# Patient Record
Sex: FEMALE | Race: BLACK OR AFRICAN AMERICAN | NOT HISPANIC OR LATINO | Employment: FULL TIME | URBAN - METROPOLITAN AREA
[De-identification: names, ages, dates, MRNs, and addresses within clinical notes are randomized per-mention and may not be internally consistent; named-entity substitution may affect disease eponyms.]

---

## 2024-03-25 LAB
EXTERNAL ABO GROUPING: NORMAL
EXTERNAL ANTIBODY SCREEN: NORMAL
EXTERNAL CHLAMYDIA SCREEN: NEGATIVE
EXTERNAL GONORRHEA SCREEN: NEGATIVE
EXTERNAL HEPATITIS B SURFACE ANTIGEN: NEGATIVE
EXTERNAL RH FACTOR: POSITIVE
EXTERNAL RUBELLA IGG QUANTITATION: POSITIVE
HEPATITIS C VIRUS AB PRESENCE IN SERUM EXTERNAL: NONREACTIVE
HIV 1/ 2 AG/AB SCREEN EXTERNAL: NEGATIVE
LAB AP CYTOLOGY REPORT FINAL EXTERNAL: NORMAL
SYPHILIS TOTAL AB EXTERNAL: NEGATIVE
URINE CULTURE EXTERNAL: NORMAL

## 2024-04-12 ENCOUNTER — HOSPITAL ENCOUNTER (OUTPATIENT)
Dept: RADIOLOGY | Facility: HOSPITAL | Age: 24
Discharge: HOME | End: 2024-04-12
Payer: COMMERCIAL

## 2024-04-12 DIAGNOSIS — O36.80X0 PREGNANCY WITH INCONCLUSIVE FETAL VIABILITY (HHS-HCC): ICD-10-CM

## 2024-04-12 PROCEDURE — 76801 OB US < 14 WKS SINGLE FETUS: CPT

## 2024-04-12 PROCEDURE — 76801 OB US < 14 WKS SINGLE FETUS: CPT | Performed by: STUDENT IN AN ORGANIZED HEALTH CARE EDUCATION/TRAINING PROGRAM

## 2024-04-30 ENCOUNTER — APPOINTMENT (OUTPATIENT)
Dept: LAB | Facility: LAB | Age: 24
End: 2024-04-30
Payer: COMMERCIAL

## 2024-04-30 ENCOUNTER — INITIAL PRENATAL (OUTPATIENT)
Dept: OBSTETRICS AND GYNECOLOGY | Facility: HOSPITAL | Age: 24
End: 2024-04-30
Payer: COMMERCIAL

## 2024-04-30 VITALS — WEIGHT: 120 LBS | SYSTOLIC BLOOD PRESSURE: 115 MMHG | BODY MASS INDEX: 21.26 KG/M2 | DIASTOLIC BLOOD PRESSURE: 80 MMHG

## 2024-04-30 DIAGNOSIS — Z34.80 SUPERVISION OF OTHER NORMAL PREGNANCY, ANTEPARTUM (HHS-HCC): Primary | ICD-10-CM

## 2024-04-30 DIAGNOSIS — D75.839 THROMBOCYTOSIS: ICD-10-CM

## 2024-04-30 DIAGNOSIS — N83.209 OVARIAN CYST AFFECTING PREGNANCY IN FIRST TRIMESTER, ANTEPARTUM (HHS-HCC): ICD-10-CM

## 2024-04-30 DIAGNOSIS — O34.81 OVARIAN CYST AFFECTING PREGNANCY IN FIRST TRIMESTER, ANTEPARTUM (HHS-HCC): ICD-10-CM

## 2024-04-30 PROBLEM — G43.909 MIGRAINES: Status: ACTIVE | Noted: 2024-04-30

## 2024-04-30 PROCEDURE — 99214 OFFICE O/P EST MOD 30 MIN: CPT | Performed by: OBSTETRICS & GYNECOLOGY

## 2024-04-30 PROCEDURE — 36415 COLL VENOUS BLD VENIPUNCTURE: CPT

## 2024-04-30 PROCEDURE — 99204 OFFICE O/P NEW MOD 45 MIN: CPT | Performed by: OBSTETRICS & GYNECOLOGY

## 2024-04-30 RX ORDER — VITAMINS AND MINERALS 1; 1000; 100; 400; 30; 3; 3; 15; 20; 12; 7; 200; 29; 20 MG/1; [IU]/1; MG/1; [IU]/1; [IU]/1; MG/1; MG/1; MG/1; MG/1; UG/1; MG/1; MG/1; MG/1; MG/1
29 TABLET, CHEWABLE ORAL
COMMUNITY
End: 2024-05-30 | Stop reason: SDUPTHER

## 2024-04-30 RX ORDER — ELETRIPTAN HYDROBROMIDE 40 MG/1
20 TABLET, FILM COATED ORAL ONCE AS NEEDED
COMMUNITY
Start: 2024-01-08

## 2024-04-30 SDOH — ECONOMIC STABILITY: FOOD INSECURITY: WITHIN THE PAST 12 MONTHS, THE FOOD YOU BOUGHT JUST DIDN'T LAST AND YOU DIDN'T HAVE MONEY TO GET MORE.: NEVER TRUE

## 2024-04-30 SDOH — ECONOMIC STABILITY: FOOD INSECURITY: WITHIN THE PAST 12 MONTHS, YOU WORRIED THAT YOUR FOOD WOULD RUN OUT BEFORE YOU GOT MONEY TO BUY MORE.: NEVER TRUE

## 2024-04-30 ASSESSMENT — EDINBURGH POSTNATAL DEPRESSION SCALE (EPDS)
THE THOUGHT OF HARMING MYSELF HAS OCCURRED TO ME: NEVER
I HAVE BEEN ANXIOUS OR WORRIED FOR NO GOOD REASON: NO, NOT AT ALL
I HAVE FELT SCARED OR PANICKY FOR NO GOOD REASON: NO, NOT MUCH
I HAVE FELT SAD OR MISERABLE: NO, NOT AT ALL
I HAVE BLAMED MYSELF UNNECESSARILY WHEN THINGS WENT WRONG: NO, NEVER
I HAVE LOOKED FORWARD WITH ENJOYMENT TO THINGS: AS MUCH AS I EVER DID
I HAVE BEEN SO UNHAPPY THAT I HAVE BEEN CRYING: NO, NEVER
THINGS HAVE BEEN GETTING ON TOP OF ME: NO, I HAVE BEEN COPING AS WELL AS EVER
I HAVE BEEN ABLE TO LAUGH AND SEE THE FUNNY SIDE OF THINGS: AS MUCH AS I ALWAYS COULD
TOTAL SCORE: 1
I HAVE BEEN SO UNHAPPY THAT I HAVE HAD DIFFICULTY SLEEPING: NOT AT ALL

## 2024-04-30 ASSESSMENT — ENCOUNTER SYMPTOMS
RESPIRATORY NEGATIVE: 0
OCCASIONAL FEELINGS OF UNSTEADINESS: 0
CARDIOVASCULAR NEGATIVE: 0
PALPITATIONS: 0
DYSURIA: 0
WEAKNESS: 0
CONSTIPATION: 0
ALLERGIC/IMMUNOLOGIC NEGATIVE: 0
NEUROLOGICAL NEGATIVE: 0
NAUSEA: 1
DEPRESSION: 0
ENDOCRINE NEGATIVE: 0
CHILLS: 0
ABDOMINAL PAIN: 0
PSYCHIATRIC NEGATIVE: 0
MUSCULOSKELETAL NEGATIVE: 0
CONSTITUTIONAL NEGATIVE: 0
HEMATURIA: 0
EYES NEGATIVE: 0
GASTROINTESTINAL NEGATIVE: 0
DIZZINESS: 0
VOMITING: 0
HEADACHES: 0
SHORTNESS OF BREATH: 0
COUGH: 0
FREQUENCY: 0
LOSS OF SENSATION IN FEET: 0
DIARRHEA: 0
FEVER: 0
HEMATOLOGIC/LYMPHATIC NEGATIVE: 1

## 2024-04-30 ASSESSMENT — PATIENT HEALTH QUESTIONNAIRE - PHQ9
2. FEELING DOWN, DEPRESSED OR HOPELESS: NOT AT ALL
1. LITTLE INTEREST OR PLEASURE IN DOING THINGS: NOT AT ALL
SUM OF ALL RESPONSES TO PHQ9 QUESTIONS 1 & 2: 0

## 2024-04-30 NOTE — PROGRESS NOTES
Initial Prenatal Visit    GEOVANNY Lane is a 24 y.o.  at 11w1d with an estimated date of delivery of 2024, by Ultrasound who presents for an initial prenatal visit.    Doing well. Has initial prenatal labs drawn at [place], some nausea, manageable without meds. Has cyst in left lower quadrant.     OB/GYN History  OB History    Para Term  AB Living   2 1 1     1   SAB IAB Ectopic Multiple Live Births           1      # Outcome Date GA Lbr Pako/2nd Weight Sex Delivery Anes PTL Lv   2 Current            1 Term 19    M    LAUREANO      No LMP recorded. Patient is pregnant.    Prior pregnancy complications: None    The following portions of the chart were reviewed this encounter and updated as appropriate:    Tobacco  Allergies  Meds  Problems  Med Hx  Surg Hx  Fam Hx         Review of Systems  Review of Systems   Constitutional:  Negative for chills and fever.   Eyes:  Negative for visual disturbance.   Respiratory:  Negative for cough and shortness of breath.    Cardiovascular:  Negative for chest pain and palpitations.   Gastrointestinal:  Positive for nausea. Negative for abdominal pain, constipation, diarrhea and vomiting.   Genitourinary:  Negative for dyspareunia, dysuria, frequency, hematuria, urgency, vaginal bleeding and vaginal discharge.   Neurological:  Negative for dizziness, weakness and headaches.        OBJECTIVE  Vitals:    24 1712   BP: 115/80   Weight: 54.4 kg (120 lb)          Expected Total Weight Gain: 11.5 kg (25 lb)-16 kg (35 lb)  Pregravid BMI: 20.55    Physical Exam  See prenatal physical exam tab    ASSESSMENT & PLAN    Migraines  - Follows with neurologist at [facility]  - No current meds    Thrombocytosis  - Follows with hematology at [facility]  - Most recent platelets 500s    Ovarian cyst affecting pregnancy in first trimester, antepartum ([facility])  - Torsion precautions reviewed  - Will reexamine at NT scheduled       Routine care  -Ultrasound  performed +FCA, previously dated  -Initial prenatal labs already done  -OB education provided.  -Recommended vaccines (COVID-19, flu).  -Clinical risk assessment for preeclampsia: low, low-dose aspirin deferred    Genetic counseling  -Patient was also counseled about options for Down's syndrome and other aneuploidy screening. After discussion, patient desires cffDNA  -Advised anatomy ultrasound at 18-20 weeks.    Pregnancy: Bosch     Delivery Plans  Planned delivery method: Vaginal  Planned anesthesia: Epidural  Acceptable blood products: All     Post-Delivery Plans  Feeding intentions: Breast Milk and Formula      Follow up in 4 weeks for return OB visit.    Kristin Gil MD  Obstetrics & Gynecology  04/30/24

## 2024-05-07 ENCOUNTER — PATIENT MESSAGE (OUTPATIENT)
Dept: OBSTETRICS AND GYNECOLOGY | Facility: HOSPITAL | Age: 24
End: 2024-05-07
Payer: COMMERCIAL

## 2024-05-07 DIAGNOSIS — O21.9 NAUSEA AND VOMITING IN PREGNANCY PRIOR TO 22 WEEKS GESTATION (HHS-HCC): Primary | ICD-10-CM

## 2024-05-07 NOTE — TELEPHONE ENCOUNTER
From: Keyla Lane  To: Kristin Gil  Sent: 5/7/2024 12:36 PM EDT  Subject: Medication request     Hi, I’ve been feeling extremely nauseous for the past few days (Today mainly) and would like to request nausea medication.

## 2024-05-08 RX ORDER — ONDANSETRON 4 MG/1
4 TABLET, FILM COATED ORAL 2 TIMES DAILY PRN
Qty: 20 TABLET | Refills: 1 | Status: SHIPPED | OUTPATIENT
Start: 2024-05-08 | End: 2024-06-07

## 2024-05-10 LAB — SCAN RESULT: NORMAL

## 2024-05-14 ENCOUNTER — HOSPITAL ENCOUNTER (OUTPATIENT)
Dept: RADIOLOGY | Facility: HOSPITAL | Age: 24
Discharge: HOME | End: 2024-05-14
Payer: COMMERCIAL

## 2024-05-14 DIAGNOSIS — Z3A.18 18 WEEKS GESTATION OF PREGNANCY (HHS-HCC): ICD-10-CM

## 2024-05-14 DIAGNOSIS — Z36.82 NUCHAL TRANSLUCENCY OF FETUS ON PRENATAL ULTRASOUND (HHS-HCC): ICD-10-CM

## 2024-05-14 PROCEDURE — 76813 OB US NUCHAL MEAS 1 GEST: CPT

## 2024-05-14 PROCEDURE — 76813 OB US NUCHAL MEAS 1 GEST: CPT | Performed by: OBSTETRICS & GYNECOLOGY

## 2024-05-30 ENCOUNTER — OFFICE VISIT (OUTPATIENT)
Dept: OBSTETRICS AND GYNECOLOGY | Facility: HOSPITAL | Age: 24
End: 2024-05-30
Payer: COMMERCIAL

## 2024-05-30 VITALS — BODY MASS INDEX: 21.79 KG/M2 | DIASTOLIC BLOOD PRESSURE: 64 MMHG | WEIGHT: 123 LBS | SYSTOLIC BLOOD PRESSURE: 103 MMHG

## 2024-05-30 DIAGNOSIS — Z34.80 SUPERVISION OF OTHER NORMAL PREGNANCY, ANTEPARTUM (HHS-HCC): Primary | ICD-10-CM

## 2024-05-30 DIAGNOSIS — N83.209 OVARIAN CYST AFFECTING PREGNANCY IN FIRST TRIMESTER, ANTEPARTUM (HHS-HCC): ICD-10-CM

## 2024-05-30 DIAGNOSIS — G43.E09 CHRONIC MIGRAINE WITH AURA WITHOUT STATUS MIGRAINOSUS, NOT INTRACTABLE: ICD-10-CM

## 2024-05-30 DIAGNOSIS — O34.81 OVARIAN CYST AFFECTING PREGNANCY IN FIRST TRIMESTER, ANTEPARTUM (HHS-HCC): ICD-10-CM

## 2024-05-30 PROCEDURE — 99385 PREV VISIT NEW AGE 18-39: CPT | Performed by: OBSTETRICS & GYNECOLOGY

## 2024-05-30 PROCEDURE — 1036F TOBACCO NON-USER: CPT | Performed by: OBSTETRICS & GYNECOLOGY

## 2024-05-30 PROCEDURE — 99213 OFFICE O/P EST LOW 20 MIN: CPT | Performed by: OBSTETRICS & GYNECOLOGY

## 2024-05-30 RX ORDER — CALCIUM CARBONATE 300MG(750)
400 TABLET,CHEWABLE ORAL DAILY
Qty: 30 TABLET | Refills: 2 | Status: SHIPPED | OUTPATIENT
Start: 2024-05-30

## 2024-05-30 RX ORDER — CYPROHEPTADINE HYDROCHLORIDE 4 MG/1
4 TABLET ORAL 2 TIMES DAILY PRN
Qty: 30 TABLET | Refills: 2 | Status: SHIPPED | OUTPATIENT
Start: 2024-05-30

## 2024-05-30 RX ORDER — VITAMINS AND MINERALS 1; 1000; 100; 400; 30; 3; 3; 15; 20; 12; 7; 200; 29; 20 MG/1; [IU]/1; MG/1; [IU]/1; [IU]/1; MG/1; MG/1; MG/1; MG/1; UG/1; MG/1; MG/1; MG/1; MG/1
1 TABLET, CHEWABLE ORAL
Qty: 30 TABLET | Refills: 3 | Status: SHIPPED | OUTPATIENT
Start: 2024-05-30

## 2024-05-30 ASSESSMENT — ENCOUNTER SYMPTOMS
LOSS OF SENSATION IN FEET: 0
DEPRESSION: 0
OCCASIONAL FEELINGS OF UNSTEADINESS: 0

## 2024-05-30 NOTE — ASSESSMENT & PLAN NOTE
- Reports ongoing Migraines   - Previously Rx'd Relpax (Eletriptan) prior to pregnancy. Encouraged discontinuation given limited safety data in pregnancy   - Will trial Periactin PRN and Mag-Ox daily. Encouraged to take Tylenol at immediate onset of migraine   - Next appt with Neuro 6/18 for additional recommendations

## 2024-05-30 NOTE — ASSESSMENT & PLAN NOTE
- Would like definitive management; discussed timing: for removal during pregnancy (preferably second trimester) if medical necessity, would otherwise recommend removal after pregnancy.

## 2024-05-30 NOTE — PROGRESS NOTES
"Ob Follow-up  24     SUBJECTIVE      HPI: Keyla Lane \"Cydney\" is a 24 y.o.  at 15w3d here for RPNV.  She denies contractions, vaginal bleeding, or LOF. Has not yet felt fetal movement. Patient reports recent onset of episodes of dizziness, sweatiness, and urge to sit.    Reports ongoing migraines 3-4x a month         OBJECTIVE  Visit Vitals  /64   Wt 55.8 kg (123 lb)   BMI 21.79 kg/m²   OB Status Pregnant   Smoking Status Never   BSA 1.57 m²            ASSESSMENT & PLAN    Keyla Lane \"Jacquelin" is a 24 y.o.  at 15w3d here for the following concerns we addressed today:    Problem List Items Addressed This Visit       Migraines    Overview     - Follows with neurologist at [facility]         Current Assessment & Plan     - Reports ongoing Migraines   - Previously Rx'd Relpax (Eletriptan) prior to pregnancy. Encouraged discontinuation given limited safety data in pregnancy   - Will trial Periactin PRN and Mag-Ox daily. Encouraged to take Tylenol at immediate onset of migraine   - Next appt with Neuro  for additional recommendations          Relevant Medications    cyproheptadine (Periactin) 4 mg tablet    magnesium oxide (Mag-Ox) 400 mg tablet    Supervision of other normal pregnancy, antepartum ([facility]) - Primary    Overview     [x] Initial BMI: 20  [x] Dating US: 8w  [x] Prenatal Labs: NEON 3/25  [x] Pap: NEON   [x] Genetic Screening: cffDNA ordered   [x] bASA: Not needed  [x] Anatomy US:   [] 1hr GCT at 24-28wks:  [] Tdap (27-36wks):  [] Flu/COVID:   [x] Rhogam (if Rh neg): N/A  [] GBS at 36 wks:  [] Breastfeeding:   [] Postpartum Birth control method:   [] 39 weeks discussion of IOL vs. Expectant management:  [] Mode of delivery:           Current Assessment & Plan     - PNV refill sent   - Considering TL, not yet decided   - New onset dizziness, sweating, & lightheadedness with prolonged standing- encouraged compression socks, frequent breaks, and hydration         Relevant " Medications    Se-Gaurav 19 Chewable 29 mg iron- 1 mg tablet,chewable    Ovarian cyst affecting pregnancy in first trimester, antepartum ([facility])    Overview     - Torsion precautions reviewed  - Will reexamine at NT scheduled          Current Assessment & Plan     - Would like definitive management; discussed timing: for removal during pregnancy (preferably second trimester) if medical necessity, would otherwise recommend removal after pregnancy.               RTC in 4 weeks    Seen & evaluated with MONY Erickson MD  PGY-1, Obstetrics & Gynecology   [facility]

## 2024-05-30 NOTE — ASSESSMENT & PLAN NOTE
- PNV refill sent   - Considering TL, not yet decided   - New onset dizziness, sweating, & lightheadedness with prolonged standing- encouraged compression socks, frequent breaks, and hydration

## 2024-05-30 NOTE — PROGRESS NOTES
I saw and evaluated the patient. I personally obtained the key and critical portions of the history and physical exam or was physically present for key and critical portions performed by the resident/fellow. I reviewed the resident/fellow's documentation and discussed the patient with the resident/fellow. I agree with the resident/fellow's medical decision making as documented in the note.    Kristin Gil MD

## 2024-06-14 DIAGNOSIS — G43.E09 CHRONIC MIGRAINE WITH AURA WITHOUT STATUS MIGRAINOSUS, NOT INTRACTABLE: ICD-10-CM

## 2024-06-14 RX ORDER — CYPROHEPTADINE HYDROCHLORIDE 4 MG/1
4 TABLET ORAL 2 TIMES DAILY PRN
Qty: 180 TABLET | Refills: 1 | Status: SHIPPED | OUTPATIENT
Start: 2024-06-14

## 2024-06-20 ENCOUNTER — HOSPITAL ENCOUNTER (OUTPATIENT)
Facility: HOSPITAL | Age: 24
End: 2024-06-20
Attending: OBSTETRICS & GYNECOLOGY | Admitting: OBSTETRICS & GYNECOLOGY
Payer: COMMERCIAL

## 2024-06-20 ENCOUNTER — HOSPITAL ENCOUNTER (OUTPATIENT)
Facility: HOSPITAL | Age: 24
Discharge: HOME | End: 2024-06-20
Attending: OBSTETRICS & GYNECOLOGY | Admitting: OBSTETRICS & GYNECOLOGY
Payer: COMMERCIAL

## 2024-06-20 VITALS
HEART RATE: 92 BPM | WEIGHT: 126 LBS | DIASTOLIC BLOOD PRESSURE: 74 MMHG | HEIGHT: 63 IN | RESPIRATION RATE: 17 BRPM | OXYGEN SATURATION: 100 % | BODY MASS INDEX: 22.32 KG/M2 | SYSTOLIC BLOOD PRESSURE: 118 MMHG | TEMPERATURE: 96.6 F

## 2024-06-20 LAB
BILIRUBIN, POC: NEGATIVE
BLOOD URINE, POC: NEGATIVE
CLARITY, POC: CLEAR
CLUE CELLS SPEC QL WET PREP: NORMAL
COLOR, POC: YELLOW
GLUCOSE URINE, POC: NEGATIVE
KETONES, POC: NEGATIVE
LEUKOCYTE EST, POC: NEGATIVE
NITRITE, POC: NEGATIVE
PH, POC: 7
POC APPEARANCE OF BODY FLUID: NORMAL
SPECIFIC GRAVITY, POC: 1.01
T VAGINALIS SPEC QL WET PREP: NORMAL
TRICHOMONAS REFLEX COMMENT: NORMAL
URINE PROTEIN, POC: NEGATIVE
UROBILINOGEN, POC: 0.2
WBC VAG QL WET PREP: NORMAL
YEAST VAG QL WET PREP: NORMAL

## 2024-06-20 PROCEDURE — 99214 OFFICE O/P EST MOD 30 MIN: CPT

## 2024-06-20 PROCEDURE — 4500999001 HC ED NO CHARGE

## 2024-06-20 PROCEDURE — 87210 SMEAR WET MOUNT SALINE/INK: CPT | Performed by: STUDENT IN AN ORGANIZED HEALTH CARE EDUCATION/TRAINING PROGRAM

## 2024-06-20 PROCEDURE — 99213 OFFICE O/P EST LOW 20 MIN: CPT | Performed by: STUDENT IN AN ORGANIZED HEALTH CARE EDUCATION/TRAINING PROGRAM

## 2024-06-20 PROCEDURE — 87491 CHLMYD TRACH DNA AMP PROBE: CPT | Performed by: STUDENT IN AN ORGANIZED HEALTH CARE EDUCATION/TRAINING PROGRAM

## 2024-06-20 PROCEDURE — 87661 TRICHOMONAS VAGINALIS AMPLIF: CPT | Performed by: STUDENT IN AN ORGANIZED HEALTH CARE EDUCATION/TRAINING PROGRAM

## 2024-06-20 RX ORDER — HYDRALAZINE HYDROCHLORIDE 20 MG/ML
5 INJECTION INTRAMUSCULAR; INTRAVENOUS ONCE AS NEEDED
Status: DISCONTINUED | OUTPATIENT
Start: 2024-06-20 | End: 2024-06-20 | Stop reason: HOSPADM

## 2024-06-20 RX ORDER — LABETALOL HYDROCHLORIDE 5 MG/ML
20 INJECTION, SOLUTION INTRAVENOUS ONCE AS NEEDED
Status: DISCONTINUED | OUTPATIENT
Start: 2024-06-20 | End: 2024-06-20 | Stop reason: HOSPADM

## 2024-06-20 RX ORDER — LIDOCAINE HYDROCHLORIDE 10 MG/ML
0.5 INJECTION INFILTRATION; PERINEURAL ONCE AS NEEDED
Status: DISCONTINUED | OUTPATIENT
Start: 2024-06-20 | End: 2024-06-20 | Stop reason: HOSPADM

## 2024-06-20 RX ORDER — NIFEDIPINE 10 MG/1
10 CAPSULE ORAL ONCE AS NEEDED
Status: DISCONTINUED | OUTPATIENT
Start: 2024-06-20 | End: 2024-06-20 | Stop reason: HOSPADM

## 2024-06-20 RX ORDER — ONDANSETRON 4 MG/1
4 TABLET, FILM COATED ORAL EVERY 6 HOURS PRN
Status: DISCONTINUED | OUTPATIENT
Start: 2024-06-20 | End: 2024-06-20 | Stop reason: HOSPADM

## 2024-06-20 RX ORDER — ONDANSETRON HYDROCHLORIDE 2 MG/ML
4 INJECTION, SOLUTION INTRAVENOUS EVERY 6 HOURS PRN
Status: DISCONTINUED | OUTPATIENT
Start: 2024-06-20 | End: 2024-06-20 | Stop reason: HOSPADM

## 2024-06-20 SDOH — SOCIAL STABILITY: SOCIAL INSECURITY: HAS ANYONE EVER THREATENED TO HURT YOUR FAMILY OR YOUR PETS?: NO

## 2024-06-20 SDOH — SOCIAL STABILITY: SOCIAL INSECURITY: ARE THERE ANY APPARENT SIGNS OF INJURIES/BEHAVIORS THAT COULD BE RELATED TO ABUSE/NEGLECT?: NO

## 2024-06-20 SDOH — HEALTH STABILITY: MENTAL HEALTH: HAVE YOU USED ANY SUBSTANCES (CANABIS, COCAINE, HEROIN, HALLUCINOGENS, INHALANTS, ETC.) IN THE PAST 12 MONTHS?: NO

## 2024-06-20 SDOH — SOCIAL STABILITY: SOCIAL INSECURITY: VERBAL ABUSE: DENIES

## 2024-06-20 SDOH — SOCIAL STABILITY: SOCIAL INSECURITY: HAVE YOU HAD THOUGHTS OF HARMING ANYONE ELSE?: NO

## 2024-06-20 SDOH — ECONOMIC STABILITY: HOUSING INSECURITY: DO YOU FEEL UNSAFE GOING BACK TO THE PLACE WHERE YOU ARE LIVING?: NO

## 2024-06-20 SDOH — SOCIAL STABILITY: SOCIAL INSECURITY: DOES ANYONE TRY TO KEEP YOU FROM HAVING/CONTACTING OTHER FRIENDS OR DOING THINGS OUTSIDE YOUR HOME?: NO

## 2024-06-20 SDOH — HEALTH STABILITY: MENTAL HEALTH: HAVE YOU USED ANY PRESCRIPTION DRUGS OTHER THAN PRESCRIBED IN THE PAST 12 MONTHS?: NO

## 2024-06-20 SDOH — HEALTH STABILITY: MENTAL HEALTH: SUICIDAL BEHAVIOR (LIFETIME): NO

## 2024-06-20 SDOH — HEALTH STABILITY: MENTAL HEALTH: WERE YOU ABLE TO COMPLETE ALL THE BEHAVIORAL HEALTH SCREENINGS?: YES

## 2024-06-20 SDOH — SOCIAL STABILITY: SOCIAL INSECURITY: ARE YOU OR HAVE YOU BEEN THREATENED OR ABUSED PHYSICALLY, EMOTIONALLY, OR SEXUALLY BY ANYONE?: NO

## 2024-06-20 SDOH — HEALTH STABILITY: MENTAL HEALTH: NON-SPECIFIC ACTIVE SUICIDAL THOUGHTS (PAST 1 MONTH): NO

## 2024-06-20 SDOH — SOCIAL STABILITY: SOCIAL INSECURITY: ABUSE SCREEN: ADULT

## 2024-06-20 SDOH — SOCIAL STABILITY: SOCIAL INSECURITY: HAVE YOU HAD ANY THOUGHTS OF HARMING ANYONE ELSE?: NO

## 2024-06-20 SDOH — SOCIAL STABILITY: SOCIAL INSECURITY: DO YOU FEEL ANYONE HAS EXPLOITED OR TAKEN ADVANTAGE OF YOU FINANCIALLY OR OF YOUR PERSONAL PROPERTY?: NO

## 2024-06-20 SDOH — HEALTH STABILITY: MENTAL HEALTH: WISH TO BE DEAD (PAST 1 MONTH): NO

## 2024-06-20 SDOH — SOCIAL STABILITY: SOCIAL INSECURITY: PHYSICAL ABUSE: DENIES

## 2024-06-20 ASSESSMENT — COLUMBIA-SUICIDE SEVERITY RATING SCALE - C-SSRS
1. IN THE PAST MONTH, HAVE YOU WISHED YOU WERE DEAD OR WISHED YOU COULD GO TO SLEEP AND NOT WAKE UP?: NO
2. HAVE YOU ACTUALLY HAD ANY THOUGHTS OF KILLING YOURSELF?: NO
6. HAVE YOU EVER DONE ANYTHING, STARTED TO DO ANYTHING, OR PREPARED TO DO ANYTHING TO END YOUR LIFE?: NO

## 2024-06-20 ASSESSMENT — LIFESTYLE VARIABLES
AUDIT-C TOTAL SCORE: 0
HOW OFTEN DO YOU HAVE A DRINK CONTAINING ALCOHOL: NEVER
HOW MANY STANDARD DRINKS CONTAINING ALCOHOL DO YOU HAVE ON A TYPICAL DAY: PATIENT DOES NOT DRINK
AUDIT-C TOTAL SCORE: 0
HOW OFTEN DO YOU HAVE 6 OR MORE DRINKS ON ONE OCCASION: NEVER
SKIP TO QUESTIONS 9-10: 1

## 2024-06-20 ASSESSMENT — PATIENT HEALTH QUESTIONNAIRE - PHQ9
SUM OF ALL RESPONSES TO PHQ9 QUESTIONS 1 & 2: 0
2. FEELING DOWN, DEPRESSED OR HOPELESS: NOT AT ALL
1. LITTLE INTEREST OR PLEASURE IN DOING THINGS: NOT AT ALL

## 2024-06-20 NOTE — ED TRIAGE NOTES
Pt presented with c/o vaginal bleeding that started today around 1300, pt states that she is 18 week pregnant

## 2024-06-21 LAB
C TRACH RRNA SPEC QL NAA+PROBE: NEGATIVE
N GONORRHOEA DNA SPEC QL PROBE+SIG AMP: NEGATIVE

## 2024-06-21 NOTE — H&P
Obstetrical Triage History and Physical     Keyla Lane is a 24 y.o.  at 18w3d by LMP c/w 8w4d US who presents to triage for vaginal bleeding.    Chief Complaint: Vaginal Bleeding - Pregnant    Assessment/Plan    Vaginal Bleeding   - c/o light bleeding with wiping x 3 episodes today  - SSE: no blood in vagina or active bleeding at cervix, cervix visibly closed   - abdomen soft, benign  - wet prep and g/c collected   - unclear etiology at this time, low suspicion for abruption, SAB  - will fu culture results   - bleeding precautions reviewed     Maternal Well-being  - Emotional support and reassurance provided  - All questions and concerns addressed    IUP @ 18w3d  - prenatal lab work reviewed   -  by doppler   - continue routine prenatal care     Dispo:  - appropriate for discharge to home, patient comfortable with this   - follow-up with Dr. Gil on  for routine prenatal visit as scheduled , follow-up for anatomy scan on  as scheduled   - return precautions reviewed     Kerrie Morrell PA-C  24 8:50 PM  Vocera    Active Problems:  There are no active Hospital Problems.      Pregnancy Problems (from 24 to present)       Problem Noted Resolved    Thrombocytosis 2024 by Kristin Gil MD No    Priority:  Medium      Migraines 2024 by Kristin Gil MD No    Priority:  Medium      Overview Signed 2024  5:14 PM by Kristin Gil MD     - Follows with neurologist at [facility]         Supervision of other normal pregnancy, antepartum ([facility]) 2024 by Kristin Gil MD No    Priority:  Medium      Overview Addendum 2024  2:34 PM by Sarah Cox MD     [x] Initial BMI: 20  [x] Dating US: 8w  [x] Prenatal Labs: NEON 3/25  [x] Pap: NEON   [x] Genetic Screening: cffDNA ordered   [x] bASA: Not needed  [x] Anatomy US:   [] 1hr GCT at 24-28wks:  [] Tdap (27-36wks):  [] Flu/COVID:   [x] Rhogam (if Rh neg): N/A  [] GBS at 36 wks:  []  Breastfeeding:   [] Postpartum Birth control method:   [] 39 weeks discussion of IOL vs. Expectant management:  [] Mode of delivery:           Ovarian cyst affecting pregnancy in first trimester, antepartum ([facility]) 2024 by Kristin Gil MD No    Priority:  Medium      Overview Signed 2024  5:17 PM by Kristin Gil MD     - Torsion precautions reviewed  - Will reexamine at NT scheduled                Subjective   Keyla is a 24 y.o.  at 18w3d by LMP c/w 8w4d US who presents to triage for vaginal bleeding. Around 1PM today at work she went to the bathroom and had bleeding with wiping. Has picture that looks like 1-2mL of light red bleeding on tissue. She had bleeding x 2 more episodes with wiping following this. Has not had any bleeding into her underwear. Last intercourse 4 days ago. Denies any falls or trauma to abdomen. Denies vaginal or urinary infection symptoms. Denies ctxs or LOF.     Obstetrical History   OB History    Para Term  AB Living   2 1 1     1   SAB IAB Ectopic Multiple Live Births           1      # Outcome Date GA Lbr Pako/2nd Weight Sex Delivery Anes PTL Lv   2 Current            1 Term 19    M    LAUREANO       Past Medical History  Past Medical History:   Diagnosis Date    Migraine 10/23        Past Surgical History   No past surgical history on file.    Social History  Social History     Tobacco Use    Smoking status: Never    Smokeless tobacco: Never   Substance Use Topics    Alcohol use: Not Currently     Comment: Occasionally- Birthdays/Celebrations     Substance and Sexual Activity   Drug Use Not Currently    Types: Marijuana       Allergies  Patient has no known allergies.     Medications  Medications Prior to Admission   Medication Sig Dispense Refill Last Dose    cyproheptadine (Periactin) 4 mg tablet TAKE 1 TABLET (4 MG) BY MOUTH 2 TIMES A DAY AS NEEDED (HEADACHE). 180 tablet 1     eletriptan (Relpax) 40 mg tablet Take 0.5 tablets (20  mg) by mouth 1 time if needed for headaches.       ferrous sulfate, dried 160 mg (50 mg iron) ER tablet Take 1 tablet (47.5 mg) by mouth once daily.       magnesium oxide (Mag-Ox) 400 mg tablet Take 1 tablet (400 mg) by mouth once daily. 30 tablet 2     Se- 19 Chewable 29 mg iron- 1 mg tablet,chewable Chew 1 tablet once daily. 30 tablet 3        Objective    Last Vitals  Temp Pulse Resp BP MAP O2 Sat   35.9 °C (96.6 °F) (!) 114 16 114/68   100 %     Physical Examination  GENERAL: Examination reveals a well developed, well nourished, gravid female in no acute distress. She is alert and cooperative.  LUNGS: Normal respiratory effort  ABDOMEN: soft, gravid, nontender, nondistended, no abnormal masses, no epigastric pain  VAGINA: normal appearing vagina with normal color and discharge and no lesions noted and no blood in vault  CERVIX: visibly closed, no bleeding at external os   FHR: 150 by doppler  NEUROLOGICAL: alert, oriented, normal speech, no focal findings or movement disorder noted  PSYCHOLOGICAL: awake and alert; oriented to person, place, and time    Lab Review  Labs in chart were reviewed.

## 2024-06-22 LAB — T VAGINALIS RRNA SPEC QL NAA+PROBE: NEGATIVE

## 2024-06-26 ENCOUNTER — HOSPITAL ENCOUNTER (OUTPATIENT)
Dept: RADIOLOGY | Facility: HOSPITAL | Age: 24
Discharge: HOME | End: 2024-06-26
Payer: COMMERCIAL

## 2024-06-26 DIAGNOSIS — Z3A.18 18 WEEKS GESTATION OF PREGNANCY (HHS-HCC): ICD-10-CM

## 2024-06-26 PROCEDURE — 76805 OB US >/= 14 WKS SNGL FETUS: CPT

## 2024-06-26 PROCEDURE — 76805 OB US >/= 14 WKS SNGL FETUS: CPT | Performed by: OBSTETRICS & GYNECOLOGY

## 2024-06-27 ENCOUNTER — ROUTINE PRENATAL (OUTPATIENT)
Dept: OBSTETRICS AND GYNECOLOGY | Facility: HOSPITAL | Age: 24
End: 2024-06-27
Payer: COMMERCIAL

## 2024-06-27 VITALS — WEIGHT: 129 LBS | SYSTOLIC BLOOD PRESSURE: 107 MMHG | DIASTOLIC BLOOD PRESSURE: 72 MMHG | BODY MASS INDEX: 22.85 KG/M2

## 2024-06-27 DIAGNOSIS — D75.839 THROMBOCYTOSIS: ICD-10-CM

## 2024-06-27 DIAGNOSIS — N83.209 OVARIAN CYST AFFECTING PREGNANCY IN FIRST TRIMESTER, ANTEPARTUM (HHS-HCC): ICD-10-CM

## 2024-06-27 DIAGNOSIS — G43.E09 CHRONIC MIGRAINE WITH AURA WITHOUT STATUS MIGRAINOSUS, NOT INTRACTABLE: ICD-10-CM

## 2024-06-27 DIAGNOSIS — Z34.80 SUPERVISION OF OTHER NORMAL PREGNANCY, ANTEPARTUM (HHS-HCC): ICD-10-CM

## 2024-06-27 DIAGNOSIS — O34.81 OVARIAN CYST AFFECTING PREGNANCY IN FIRST TRIMESTER, ANTEPARTUM (HHS-HCC): ICD-10-CM

## 2024-06-27 PROCEDURE — 99213 OFFICE O/P EST LOW 20 MIN: CPT | Performed by: OBSTETRICS & GYNECOLOGY

## 2024-06-27 PROCEDURE — 99213 OFFICE O/P EST LOW 20 MIN: CPT | Mod: TH | Performed by: OBSTETRICS & GYNECOLOGY

## 2024-06-27 ASSESSMENT — ENCOUNTER SYMPTOMS
LOSS OF SENSATION IN FEET: 0
OCCASIONAL FEELINGS OF UNSTEADINESS: 0
DEPRESSION: 0

## 2024-06-27 NOTE — PROGRESS NOTES
"GEOVANNY Lane \"Cydney\" is a 24 y.o.  at 19w3d with an estimated date of delivery of 2024, by Ultrasound who presents for a routine prenatal visit.    She denies loss of fluid, vaginal bleeding, regular contractions/cramping, and decreased fetal movement. No bleeding since triage visit.     OBJECTIVE  Vitals:    24 1610   BP: 107/72   Weight: 58.5 kg (129 lb)          ASSESSMENT & PLAN    Ovarian cyst affecting pregnancy in first trimester, antepartum ([facility])  - Resolved on anatomy US    Supervision of other normal pregnancy, antepartum ([facility])  - Up to date on care  - Plan for OGTT next visit    Follow up in 4 weeks for return OB visit.    Kristin Gil MD  Obstetrics & Gynecology  24   "

## 2024-06-29 DIAGNOSIS — G43.E09 CHRONIC MIGRAINE WITH AURA WITHOUT STATUS MIGRAINOSUS, NOT INTRACTABLE: ICD-10-CM

## 2024-07-08 RX ORDER — CALCIUM CARBONATE/VITAMIN D3 500-10/5ML
LIQUID (ML) ORAL
Qty: 90 CAPSULE | Refills: 1 | Status: SHIPPED | OUTPATIENT
Start: 2024-07-08

## 2024-07-25 ENCOUNTER — ROUTINE PRENATAL (OUTPATIENT)
Dept: OBSTETRICS AND GYNECOLOGY | Facility: HOSPITAL | Age: 24
End: 2024-07-25
Payer: COMMERCIAL

## 2024-07-25 VITALS — WEIGHT: 139 LBS | BODY MASS INDEX: 24.62 KG/M2 | DIASTOLIC BLOOD PRESSURE: 76 MMHG | SYSTOLIC BLOOD PRESSURE: 112 MMHG

## 2024-07-25 DIAGNOSIS — O99.019 IRON DEFICIENCY ANEMIA DURING PREGNANCY (HHS-HCC): Primary | ICD-10-CM

## 2024-07-25 DIAGNOSIS — D50.9 IRON DEFICIENCY ANEMIA DURING PREGNANCY (HHS-HCC): Primary | ICD-10-CM

## 2024-07-25 PROBLEM — N83.209 OVARIAN CYST AFFECTING PREGNANCY IN FIRST TRIMESTER, ANTEPARTUM (HHS-HCC): Status: RESOLVED | Noted: 2024-04-30 | Resolved: 2024-07-25

## 2024-07-25 PROBLEM — M54.32 SCIATIC NERVE PAIN, LEFT: Status: ACTIVE | Noted: 2024-07-25

## 2024-07-25 PROBLEM — O34.81 OVARIAN CYST AFFECTING PREGNANCY IN FIRST TRIMESTER, ANTEPARTUM (HHS-HCC): Status: RESOLVED | Noted: 2024-04-30 | Resolved: 2024-07-25

## 2024-07-25 PROCEDURE — 99213 OFFICE O/P EST LOW 20 MIN: CPT | Mod: TH | Performed by: OBSTETRICS & GYNECOLOGY

## 2024-07-25 PROCEDURE — 99213 OFFICE O/P EST LOW 20 MIN: CPT | Performed by: OBSTETRICS & GYNECOLOGY

## 2024-07-25 RX ORDER — EPINEPHRINE 0.3 MG/.3ML
0.3 INJECTION SUBCUTANEOUS EVERY 5 MIN PRN
OUTPATIENT
Start: 2024-07-26

## 2024-07-25 RX ORDER — FAMOTIDINE 10 MG/ML
20 INJECTION INTRAVENOUS ONCE AS NEEDED
OUTPATIENT
Start: 2024-07-26

## 2024-07-25 RX ORDER — ALBUTEROL SULFATE 0.83 MG/ML
3 SOLUTION RESPIRATORY (INHALATION) AS NEEDED
OUTPATIENT
Start: 2024-07-26

## 2024-07-25 RX ORDER — DIPHENHYDRAMINE HYDROCHLORIDE 50 MG/ML
50 INJECTION INTRAMUSCULAR; INTRAVENOUS AS NEEDED
OUTPATIENT
Start: 2024-07-26

## 2024-07-25 ASSESSMENT — ENCOUNTER SYMPTOMS
OCCASIONAL FEELINGS OF UNSTEADINESS: 0
LOSS OF SENSATION IN FEET: 0
DEPRESSION: 0

## 2024-07-25 NOTE — ASSESSMENT & PLAN NOTE
- Reviewed sciatic nerve pain in pregnancy  - Discussed stretches, declines chiropractor/PT  - Reviewed precautions for DVT given one sided pain - no erythema or edema on exam

## 2024-07-25 NOTE — ASSESSMENT & PLAN NOTE
- Follows with hematologist at [facility]  - Unable to tolerate oral iron  - Interested in pursuing IV iron transfusion

## 2024-07-25 NOTE — PROGRESS NOTES
"GEOVANNY Lane \"Cydney\" is a 24 y.o.  at 23w3d with an estimated date of delivery of 2024, by Ultrasound who presents for a routine prenatal visit.    She denies loss of fluid, vaginal bleeding, regular contractions/cramping, and decreased fetal movement. Left leg pain down the back of her leg. Worried about tachycardia at hematologist yesterday. Reviewed precautions. Consider thyroid function testing with next set of labs. Recommended hydration.       OBJECTIVE  Vitals:    24 1546   BP: 112/76   Weight: 63 kg (139 lb)          ASSESSMENT & PLAN    Supervision of other normal pregnancy, antepartum ([facility])  - Up to date on care  - Plan for OGTT next visit    Iron deficiency anemia during pregnancy ([facility])  - Follows with hematologist at [facility]  - Unable to tolerate oral iron  - Interested in pursuing IV iron transfusion    Sciatic nerve pain, left  - Reviewed sciatic nerve pain in pregnancy  - Discussed stretches, declines chiropractor/PT  - Reviewed precautions for DVT given one sided pain - no erythema or edema on exam    Follow up in 2-3 weeks for return OB visit.    Kristin Gil MD  Obstetrics & Gynecology  24   "

## 2024-08-08 ENCOUNTER — INFUSION (OUTPATIENT)
Dept: INFUSION THERAPY | Facility: HOSPITAL | Age: 24
End: 2024-08-08
Payer: COMMERCIAL

## 2024-08-08 ENCOUNTER — LAB (OUTPATIENT)
Dept: LAB | Facility: LAB | Age: 24
End: 2024-08-08
Payer: COMMERCIAL

## 2024-08-08 ENCOUNTER — ROUTINE PRENATAL (OUTPATIENT)
Dept: OBSTETRICS AND GYNECOLOGY | Facility: HOSPITAL | Age: 24
End: 2024-08-08
Payer: COMMERCIAL

## 2024-08-08 ENCOUNTER — DOCUMENTATION (OUTPATIENT)
Dept: OBSTETRICS AND GYNECOLOGY | Facility: HOSPITAL | Age: 24
End: 2024-08-08

## 2024-08-08 VITALS
SYSTOLIC BLOOD PRESSURE: 102 MMHG | TEMPERATURE: 98 F | BODY MASS INDEX: 25.33 KG/M2 | HEART RATE: 101 BPM | OXYGEN SATURATION: 99 % | DIASTOLIC BLOOD PRESSURE: 66 MMHG | WEIGHT: 143 LBS | RESPIRATION RATE: 15 BRPM

## 2024-08-08 VITALS — WEIGHT: 143 LBS | BODY MASS INDEX: 25.33 KG/M2 | SYSTOLIC BLOOD PRESSURE: 114 MMHG | DIASTOLIC BLOOD PRESSURE: 72 MMHG

## 2024-08-08 DIAGNOSIS — D50.9 IRON DEFICIENCY ANEMIA DURING PREGNANCY (HHS-HCC): ICD-10-CM

## 2024-08-08 DIAGNOSIS — Z34.82 PRENATAL CARE, SUBSEQUENT PREGNANCY IN SECOND TRIMESTER (HHS-HCC): ICD-10-CM

## 2024-08-08 DIAGNOSIS — Z34.80 SUPERVISION OF OTHER NORMAL PREGNANCY, ANTEPARTUM (HHS-HCC): ICD-10-CM

## 2024-08-08 DIAGNOSIS — O99.019 IRON DEFICIENCY ANEMIA DURING PREGNANCY (HHS-HCC): ICD-10-CM

## 2024-08-08 DIAGNOSIS — R10.12 LEFT UPPER QUADRANT PAIN: ICD-10-CM

## 2024-08-08 DIAGNOSIS — G43.E09 CHRONIC MIGRAINE WITH AURA WITHOUT STATUS MIGRAINOSUS, NOT INTRACTABLE: ICD-10-CM

## 2024-08-08 DIAGNOSIS — Z3A.25 25 WEEKS GESTATION OF PREGNANCY (HHS-HCC): Primary | ICD-10-CM

## 2024-08-08 DIAGNOSIS — Z3A.25 25 WEEKS GESTATION OF PREGNANCY (HHS-HCC): ICD-10-CM

## 2024-08-08 DIAGNOSIS — D75.839 THROMBOCYTOSIS: ICD-10-CM

## 2024-08-08 DIAGNOSIS — M54.32 SCIATIC NERVE PAIN, LEFT: ICD-10-CM

## 2024-08-08 LAB
ALBUMIN SERPL BCP-MCNC: 3.3 G/DL (ref 3.4–5)
ALP SERPL-CCNC: 82 U/L (ref 33–110)
ALT SERPL W P-5'-P-CCNC: 7 U/L (ref 7–45)
AMYLASE SERPL-CCNC: 84 U/L (ref 29–103)
ANION GAP SERPL CALC-SCNC: 10 MMOL/L (ref 10–20)
AST SERPL W P-5'-P-CCNC: 10 U/L (ref 9–39)
BILIRUB SERPL-MCNC: 0.2 MG/DL (ref 0–1.2)
BUN SERPL-MCNC: 4 MG/DL (ref 6–23)
CALCIUM SERPL-MCNC: 8.6 MG/DL (ref 8.6–10.6)
CHLORIDE SERPL-SCNC: 105 MMOL/L (ref 98–107)
CO2 SERPL-SCNC: 25 MMOL/L (ref 21–32)
CREAT SERPL-MCNC: 0.43 MG/DL (ref 0.5–1.05)
EGFRCR SERPLBLD CKD-EPI 2021: >90 ML/MIN/1.73M*2
GLUCOSE 1H P 50 G GLC PO SERPL-MCNC: 123 MG/DL
GLUCOSE SERPL-MCNC: 123 MG/DL (ref 74–99)
LIPASE SERPL-CCNC: 20 U/L (ref 9–82)
POTASSIUM SERPL-SCNC: 4.2 MMOL/L (ref 3.5–5.3)
PROT SERPL-MCNC: 6.4 G/DL (ref 6.4–8.2)
SODIUM SERPL-SCNC: 136 MMOL/L (ref 136–145)

## 2024-08-08 PROCEDURE — 2500000004 HC RX 250 GENERAL PHARMACY W/ HCPCS (ALT 636 FOR OP/ED): Mod: JZ | Performed by: OBSTETRICS & GYNECOLOGY

## 2024-08-08 PROCEDURE — 96365 THER/PROPH/DIAG IV INF INIT: CPT

## 2024-08-08 PROCEDURE — 36415 COLL VENOUS BLD VENIPUNCTURE: CPT

## 2024-08-08 PROCEDURE — 96366 THER/PROPH/DIAG IV INF ADDON: CPT

## 2024-08-08 PROCEDURE — 99213 OFFICE O/P EST LOW 20 MIN: CPT

## 2024-08-08 PROCEDURE — 82947 ASSAY GLUCOSE BLOOD QUANT: CPT

## 2024-08-08 PROCEDURE — 80053 COMPREHEN METABOLIC PANEL: CPT

## 2024-08-08 PROCEDURE — 83690 ASSAY OF LIPASE: CPT

## 2024-08-08 PROCEDURE — 82150 ASSAY OF AMYLASE: CPT

## 2024-08-08 PROCEDURE — 99213 OFFICE O/P EST LOW 20 MIN: CPT | Mod: TH

## 2024-08-08 RX ORDER — EPINEPHRINE 0.3 MG/.3ML
0.3 INJECTION SUBCUTANEOUS EVERY 5 MIN PRN
Status: DISCONTINUED | OUTPATIENT
Start: 2024-08-08 | End: 2024-08-08 | Stop reason: HOSPADM

## 2024-08-08 RX ORDER — EPINEPHRINE 0.3 MG/.3ML
0.3 INJECTION SUBCUTANEOUS EVERY 5 MIN PRN
OUTPATIENT
Start: 2024-08-08

## 2024-08-08 RX ORDER — DIPHENHYDRAMINE HYDROCHLORIDE 50 MG/ML
50 INJECTION INTRAMUSCULAR; INTRAVENOUS AS NEEDED
OUTPATIENT
Start: 2024-08-08

## 2024-08-08 RX ORDER — FAMOTIDINE 10 MG/ML
20 INJECTION INTRAVENOUS ONCE AS NEEDED
Status: DISCONTINUED | OUTPATIENT
Start: 2024-08-08 | End: 2024-08-08 | Stop reason: HOSPADM

## 2024-08-08 RX ORDER — ALBUTEROL SULFATE 0.83 MG/ML
3 SOLUTION RESPIRATORY (INHALATION) AS NEEDED
OUTPATIENT
Start: 2024-08-08

## 2024-08-08 RX ORDER — FAMOTIDINE 10 MG/ML
20 INJECTION INTRAVENOUS ONCE AS NEEDED
OUTPATIENT
Start: 2024-08-08

## 2024-08-08 RX ORDER — ALBUTEROL SULFATE 0.83 MG/ML
3 SOLUTION RESPIRATORY (INHALATION) AS NEEDED
Status: DISCONTINUED | OUTPATIENT
Start: 2024-08-08 | End: 2024-08-08 | Stop reason: HOSPADM

## 2024-08-08 RX ORDER — DIPHENHYDRAMINE HYDROCHLORIDE 50 MG/ML
50 INJECTION INTRAMUSCULAR; INTRAVENOUS AS NEEDED
Status: DISCONTINUED | OUTPATIENT
Start: 2024-08-08 | End: 2024-08-08 | Stop reason: HOSPADM

## 2024-08-08 ASSESSMENT — PAIN SCALES - GENERAL
PAINLEVEL: 0-NO PAIN

## 2024-08-08 ASSESSMENT — EDINBURGH POSTNATAL DEPRESSION SCALE (EPDS)
THE THOUGHT OF HARMING MYSELF HAS OCCURRED TO ME: NEVER
THINGS HAVE BEEN GETTING ON TOP OF ME: NO, I HAVE BEEN COPING AS WELL AS EVER
I HAVE FELT SCARED OR PANICKY FOR NO GOOD REASON: NO, NOT AT ALL
I HAVE FELT SAD OR MISERABLE: NO, NOT AT ALL
I HAVE BLAMED MYSELF UNNECESSARILY WHEN THINGS WENT WRONG: NO, NEVER
I HAVE LOOKED FORWARD WITH ENJOYMENT TO THINGS: AS MUCH AS I EVER DID
I HAVE BEEN SO UNHAPPY THAT I HAVE HAD DIFFICULTY SLEEPING: NOT AT ALL
TOTAL SCORE: 0
I HAVE BEEN ANXIOUS OR WORRIED FOR NO GOOD REASON: NO, NOT AT ALL
I HAVE BEEN SO UNHAPPY THAT I HAVE BEEN CRYING: NO, NEVER
I HAVE BEEN ABLE TO LAUGH AND SEE THE FUNNY SIDE OF THINGS: AS MUCH AS I ALWAYS COULD

## 2024-08-08 ASSESSMENT — ENCOUNTER SYMPTOMS
OCCASIONAL FEELINGS OF UNSTEADINESS: 0
LOSS OF SENSATION IN FEET: 0
DEPRESSION: 0

## 2024-08-08 NOTE — PROGRESS NOTES
[facility] 1200 Infusion Clinic Note   Date: 2024   Name: Keyla Lane  : 2000   MRN: [id number]         Reason for Visit: IV Medication (Patient here for iron dextran infusion/Patient denies falls/Patient denies pain)      Accompanied by:Self   Visit Type:: Infusion   Diagnosis: Iron deficiency anemia during pregnancy ([facility])    Thrombocytosis    Chronic migraine with aura without status migrainosus, not intractable    Supervision of other normal pregnancy, antepartum ([facility])    Sciatic nerve pain, left    Allergies:   Allergies as of 2024    (No Known Allergies)      Current Meds has a current medication list which includes the following prescription(s): cyproheptadine, eletriptan, ferrous sulfate, dried, magnesium oxide, and se-troy 19 chewable, and the following Facility-Administered Medications: albuterol, dextrose, diphenhydramine, epinephrine, famotidine pf, methylprednisolone sod succinate, and sodium chloride.        Vitals:  Vitals:    24 0945 24 1015 24 1045 24 1145   BP: 97/64 96/63 92/60 95/66   Pulse: 90 95 93 97   Resp: 14 15 15 15   Temp: 36.6 °C (97.9 °F) 36.7 °C (98 °F) 36.6 °C (97.8 °F) 36.4 °C (97.6 °F)   SpO2: 99% 98% 97% 98%   Weight: 64.9 kg (143 lb) 64.9 kg (143 lb) 64.9 kg (143 lb) 64.9 kg (143 lb)      FHR Before 153  FHR After 145    Infusion Pre-procedure Checklist   Allergies reviewed: yes   Medications reviewed: yes   Contraindications to treatment:No   Previous reaction to current treatment:No   Current Health Issues: None and Pregnancy   Pain: 0-no pain [0]'    Contraindications based on patient history: No   Provider notified: Yes  Christine Kitchen M Resident      Steadi Fall Risk  One or more falls in the last year? No  How many Times?    Was the patient injured in the fall?    Has trouble stepping onto curb? No  Advised to use a cane or walker to get around safely? No  Often has to rush to  toilet? No  Feels unsteady when walking? No  Has lost some feeling in feet? No  Often feels sad or depressed? No  Steadies self on furniture while walking at home? No  Takes medicine that makes them feel lightheaded or more tired than usual? No  Worried about Falling? No  Takes medicine to sleep or improve mood? No  Needs to push with hands when rising from a chair? No      Negative for complaint: [x] all other systems have been reviewed and are negative for complaint   Infusion Readiness:   Assessment Concerns Related to Infusion: No  Provider notified: yes, n/a  Assess patient for the concerns below. Document provider notification as appropriate:  - Does not meet criteria to treat NO  - Has an active or recent infection with/without current antibiotic use N/A  - Has recent/planned dental work N/A  - Has recent/planned surgeries N/A  - Has recently received or plans to receive vaccinations N/A  - Has treatment related toxicities N/A  - Is pregnant (unless noted otherwise) Yes      We administered iron dextran complex (Infed) 25 mg in sodium chloride 0.9% 50 mL IV and iron dextran complex (Infed) 975 mg in sodium chloride 0.9% 500 mL IV.     Patient remained in office for 30 additional minutes after infusion stopped to assess for reaction.   Patient remained stable the entire 30 minutes.   Patient tolerated infusion well, no reactions or complications.   Patient discharged home ambulatory.

## 2024-08-08 NOTE — PROGRESS NOTES
"Assessment/Plan   24 y.o.  at 25w3d  - iron infusion today  - 1hr GCT today  - discussed Tdap to be offered at next visit, pt does intend to receive it  - discussed with patient that LUQ pain is likely musculoskeletal in nature. Reviewed physiologic changes of pregnancy including softening of cartilage in response to increased progesterone levels, and widening of ribcage to make room for gravid uterus. Did encourage her to try both TUMS and Pepcid, however, to rule out heartburn. Also ordered CMP, Amylase and Lipase to be collected with GCT today to rule out liver or pancreatic etiologies. Order for abdominal ultrasound placed to rule out splenic involvement.   - Routine prenatal care    Follow up in 4 weeks for next prenatal visit. Review labs and US results. Repeat CBC / Retic.    Malina CARLOTA Crocker-SANNA    Subjective     Keylasharron Lane \"Cydney\" is a 24 y.o.  at 25w3d with a working estimated date of delivery of 2024, by Ultrasound presenting for a routine prenatal visit. She is doing well. She denies vaginal bleeding, leakage of fluid, contractions, or decreased fetal movement.    Emily expresses concern for constant LUQ pain today. She points to the area just below and at the level of her lower left ribs. She describes the pain as a \"stabbing\" and rates it as a 9/10 severity. She denies precipitating nor alleviating factors. No change in pain in relation to position nor eating. She states she is still able to perform activities of daily living and work amidst this pain.     Her pregnancy is complicated by:  Pregnancy Problems (from 24 to present)       Problem Noted Resolved    Iron deficiency anemia during pregnancy ([facility]) 2024 by Kristin Gil MD No    Overview Signed 2024  3:48 PM by Kristin Gil MD     - Follows with hematologist at [facility]  - Unable to tolerate oral iron  - Interested in pursuing IV iron transfusion         Sciatic nerve pain, left " 2024 by Kristin Gil MD No    Overview Signed 2024  4:02 PM by Kristin Gil MD     - Reviewed sciatic nerve pain in pregnancy  - Discussed stretches, declines chiropractor/PT  - Reviewed precautions for DVT given one sided pain - no erythema or edema on exam         Thrombocytosis 2024 by Kristin Gil MD No    Migraines 2024 by Kristin Gil MD No    Overview Signed 2024  5:14 PM by Kristin Gil MD     - Follows with neurologist at [facility]         Supervision of other normal pregnancy, antepartum ([facility]) 2024 by Kristin Gil MD No    Overview Addendum 2024  2:34 PM by Sarah Cox MD     [x] Initial BMI: 20  [x] Dating US: 8w  [x] Prenatal Labs: NEON 3/25  [x] Pap: NEON   [x] Genetic Screening: cffDNA ordered   [x] bASA: Not needed  [x] Anatomy US:   [] 1hr GCT at 24-28wks:  [] Tdap (27-36wks):  [] Flu/COVID:   [x] Rhogam (if Rh neg): N/A  [] GBS at 36 wks:  [] Breastfeeding:   [] Postpartum Birth control method:   [] 39 weeks discussion of IOL vs. Expectant management:  [] Mode of delivery:           Ovarian cyst affecting pregnancy in first trimester, antepartum ([facility]) 2024 by Kristin Gil MD 2024 by Kristin Gil MD    Overview Signed 2024  5:17 PM by Kristin Gil MD     - Torsion precautions reviewed  - Will reexamine at NT scheduled                   Objective   Weight: 64.9 kg (143 lb)  TW.2 kg (27 lb)   Expected Total Weight Gain: 11.5 kg (25 lb)-16 kg (35 lb)   Pregravid BMI: 20.55  Pregravid Weight: 52.6 kg (116 lb)   BP: 114/72  Fetal Heart Rate: 150 Fundal Height (cm): 26 cm            Abdomen soft upon palpation. No masses palpated in the LUQ. Pt does endorse tenderness to the epigastric area and left ribs, however palpation does not illicit guarding or grimacing.

## 2024-08-14 ENCOUNTER — TELEPHONE (OUTPATIENT)
Dept: OBSTETRICS AND GYNECOLOGY | Facility: HOSPITAL | Age: 24
End: 2024-08-14
Payer: COMMERCIAL

## 2024-08-14 ENCOUNTER — HOSPITAL ENCOUNTER (INPATIENT)
Facility: HOSPITAL | Age: 24
LOS: 1 days | Discharge: HOME | DRG: 832 | End: 2024-08-15
Attending: STUDENT IN AN ORGANIZED HEALTH CARE EDUCATION/TRAINING PROGRAM | Admitting: STUDENT IN AN ORGANIZED HEALTH CARE EDUCATION/TRAINING PROGRAM
Payer: COMMERCIAL

## 2024-08-14 DIAGNOSIS — O99.019 IRON DEFICIENCY ANEMIA DURING PREGNANCY (HHS-HCC): Primary | ICD-10-CM

## 2024-08-14 DIAGNOSIS — R10.12 LEFT UPPER QUADRANT PAIN: ICD-10-CM

## 2024-08-14 DIAGNOSIS — D50.9 IRON DEFICIENCY ANEMIA DURING PREGNANCY (HHS-HCC): Primary | ICD-10-CM

## 2024-08-14 DIAGNOSIS — O36.8121 DECREASED FETAL MOVEMENTS IN SECOND TRIMESTER, FETUS 1 OF MULTIPLE GESTATION (HHS-HCC): ICD-10-CM

## 2024-08-14 DIAGNOSIS — R00.0 TACHYCARDIA: ICD-10-CM

## 2024-08-14 PROBLEM — O36.8120 DECREASED FETAL MOVEMENTS IN SECOND TRIMESTER (HHS-HCC): Status: ACTIVE | Noted: 2024-08-14

## 2024-08-14 LAB
ABO GROUP (TYPE) IN BLOOD: NORMAL
ALBUMIN SERPL BCP-MCNC: 3.6 G/DL (ref 3.4–5)
ALP SERPL-CCNC: 97 U/L (ref 33–110)
ALT SERPL W P-5'-P-CCNC: 12 U/L (ref 7–45)
ANION GAP SERPL CALC-SCNC: 14 MMOL/L (ref 10–20)
ANTIBODY SCREEN: NORMAL
APTT PPP: 25 SECONDS (ref 27–38)
APTT PPP: 27 SECONDS (ref 27–38)
AST SERPL W P-5'-P-CCNC: 18 U/L (ref 9–39)
BILIRUB SERPL-MCNC: 0.2 MG/DL (ref 0–1.2)
BUN SERPL-MCNC: 6 MG/DL (ref 6–23)
CALCIUM SERPL-MCNC: 9.4 MG/DL (ref 8.6–10.6)
CHLORIDE SERPL-SCNC: 102 MMOL/L (ref 98–107)
CO2 SERPL-SCNC: 25 MMOL/L (ref 21–32)
CREAT SERPL-MCNC: 0.49 MG/DL (ref 0.5–1.05)
EGFRCR SERPLBLD CKD-EPI 2021: >90 ML/MIN/1.73M*2
ERYTHROCYTE [DISTWIDTH] IN BLOOD BY AUTOMATED COUNT: 14.4 % (ref 11.5–14.5)
ERYTHROCYTE [DISTWIDTH] IN BLOOD BY AUTOMATED COUNT: 14.5 % (ref 11.5–14.5)
FIBRINOGEN PPP-MCNC: 295 MG/DL (ref 200–400)
FIBRINOGEN PPP-MCNC: 301 MG/DL (ref 200–400)
GLUCOSE BLD MANUAL STRIP-MCNC: 92 MG/DL (ref 74–99)
GLUCOSE SERPL-MCNC: 74 MG/DL (ref 74–99)
HCT VFR BLD AUTO: 31.4 % (ref 36–46)
HCT VFR BLD AUTO: 31.7 % (ref 36–46)
HGB BLD-MCNC: 10 G/DL (ref 12–16)
HGB BLD-MCNC: 10 G/DL (ref 12–16)
HOLD SPECIMEN: NORMAL
HOLD SPECIMEN: NORMAL
INR PPP: 0.9 (ref 0.9–1.1)
INR PPP: 0.9 (ref 0.9–1.1)
MCH RBC QN AUTO: 24.1 PG (ref 26–34)
MCH RBC QN AUTO: 24.3 PG (ref 26–34)
MCHC RBC AUTO-ENTMCNC: 31.5 G/DL (ref 32–36)
MCHC RBC AUTO-ENTMCNC: 31.8 G/DL (ref 32–36)
MCV RBC AUTO: 76 FL (ref 80–100)
MCV RBC AUTO: 76 FL (ref 80–100)
NRBC BLD-RTO: 0 /100 WBCS (ref 0–0)
NRBC BLD-RTO: 0 /100 WBCS (ref 0–0)
PLATELET # BLD AUTO: 395 X10*3/UL (ref 150–450)
PLATELET # BLD AUTO: 420 X10*3/UL (ref 150–450)
POC APPEARANCE, URINE: CLEAR
POC BILIRUBIN, URINE: NEGATIVE
POC BLOOD, URINE: NEGATIVE
POC COLOR, URINE: YELLOW
POC GLUCOSE, URINE: NEGATIVE MG/DL
POC KETONES, URINE: NEGATIVE MG/DL
POC LEUKOCYTES, URINE: NEGATIVE
POC NITRITE,URINE: NEGATIVE
POC PH, URINE: 7 PH
POC PROTEIN, URINE: NEGATIVE MG/DL
POC SPECIFIC GRAVITY, URINE: 1.02
POC UROBILINOGEN, URINE: 0.2 EU/DL
POTASSIUM SERPL-SCNC: 4.6 MMOL/L (ref 3.5–5.3)
PROT SERPL-MCNC: 7 G/DL (ref 6.4–8.2)
PROTHROMBIN TIME: 10 SECONDS (ref 9.8–12.8)
PROTHROMBIN TIME: 9.9 SECONDS (ref 9.8–12.8)
RBC # BLD AUTO: 4.11 X10*6/UL (ref 4–5.2)
RBC # BLD AUTO: 4.15 X10*6/UL (ref 4–5.2)
RH FACTOR (ANTIGEN D): NORMAL
SODIUM SERPL-SCNC: 136 MMOL/L (ref 136–145)
TREPONEMA PALLIDUM IGG+IGM AB [PRESENCE] IN SERUM OR PLASMA BY IMMUNOASSAY: NONREACTIVE
WBC # BLD AUTO: 13.6 X10*3/UL (ref 4.4–11.3)
WBC # BLD AUTO: 15.4 X10*3/UL (ref 4.4–11.3)

## 2024-08-14 PROCEDURE — 99223 1ST HOSP IP/OBS HIGH 75: CPT | Performed by: FAMILY MEDICINE

## 2024-08-14 PROCEDURE — 80053 COMPREHEN METABOLIC PANEL: CPT | Performed by: STUDENT IN AN ORGANIZED HEALTH CARE EDUCATION/TRAINING PROGRAM

## 2024-08-14 PROCEDURE — 81002 URINALYSIS NONAUTO W/O SCOPE: CPT | Performed by: FAMILY MEDICINE

## 2024-08-14 PROCEDURE — 99214 OFFICE O/P EST MOD 30 MIN: CPT | Mod: 25

## 2024-08-14 PROCEDURE — 2500000001 HC RX 250 WO HCPCS SELF ADMINISTERED DRUGS (ALT 637 FOR MEDICARE OP): Performed by: FAMILY MEDICINE

## 2024-08-14 PROCEDURE — 59025 FETAL NON-STRESS TEST: CPT | Performed by: FAMILY MEDICINE

## 2024-08-14 PROCEDURE — 84443 ASSAY THYROID STIM HORMONE: CPT

## 2024-08-14 PROCEDURE — 86901 BLOOD TYPING SEROLOGIC RH(D): CPT

## 2024-08-14 PROCEDURE — 83735 ASSAY OF MAGNESIUM: CPT

## 2024-08-14 PROCEDURE — 85384 FIBRINOGEN ACTIVITY: CPT | Performed by: STUDENT IN AN ORGANIZED HEALTH CARE EDUCATION/TRAINING PROGRAM

## 2024-08-14 PROCEDURE — 96366 THER/PROPH/DIAG IV INF ADDON: CPT | Mod: 59

## 2024-08-14 PROCEDURE — 85610 PROTHROMBIN TIME: CPT

## 2024-08-14 PROCEDURE — 85027 COMPLETE CBC AUTOMATED: CPT | Performed by: STUDENT IN AN ORGANIZED HEALTH CARE EDUCATION/TRAINING PROGRAM

## 2024-08-14 PROCEDURE — 2500000004 HC RX 250 GENERAL PHARMACY W/ HCPCS (ALT 636 FOR OP/ED)

## 2024-08-14 PROCEDURE — G0378 HOSPITAL OBSERVATION PER HR: HCPCS

## 2024-08-14 PROCEDURE — 85610 PROTHROMBIN TIME: CPT | Performed by: STUDENT IN AN ORGANIZED HEALTH CARE EDUCATION/TRAINING PROGRAM

## 2024-08-14 PROCEDURE — 99199 UNLISTED SPECIAL SVC PX/RPRT: CPT

## 2024-08-14 PROCEDURE — 82947 ASSAY GLUCOSE BLOOD QUANT: CPT

## 2024-08-14 PROCEDURE — 2500000004 HC RX 250 GENERAL PHARMACY W/ HCPCS (ALT 636 FOR OP/ED): Performed by: FAMILY MEDICINE

## 2024-08-14 PROCEDURE — 36415 COLL VENOUS BLD VENIPUNCTURE: CPT | Performed by: STUDENT IN AN ORGANIZED HEALTH CARE EDUCATION/TRAINING PROGRAM

## 2024-08-14 PROCEDURE — 86780 TREPONEMA PALLIDUM: CPT

## 2024-08-14 PROCEDURE — 85027 COMPLETE CBC AUTOMATED: CPT

## 2024-08-14 PROCEDURE — 85384 FIBRINOGEN ACTIVITY: CPT

## 2024-08-14 PROCEDURE — 96365 THER/PROPH/DIAG IV INF INIT: CPT

## 2024-08-14 RX ORDER — CYCLOBENZAPRINE HCL 10 MG
10 TABLET ORAL ONCE
Status: COMPLETED | OUTPATIENT
Start: 2024-08-14 | End: 2024-08-14

## 2024-08-14 RX ORDER — NIFEDIPINE 10 MG/1
10 CAPSULE ORAL ONCE AS NEEDED
Status: CANCELLED | OUTPATIENT
Start: 2024-08-14

## 2024-08-14 RX ORDER — MAGNESIUM SULFATE HEPTAHYDRATE 40 MG/ML
2 INJECTION, SOLUTION INTRAVENOUS CONTINUOUS
Status: DISCONTINUED | OUTPATIENT
Start: 2024-08-14 | End: 2024-08-15

## 2024-08-14 RX ORDER — BISACODYL 10 MG/1
10 SUPPOSITORY RECTAL DAILY PRN
Status: CANCELLED | OUTPATIENT
Start: 2024-08-14

## 2024-08-14 RX ORDER — HYDRALAZINE HYDROCHLORIDE 20 MG/ML
5 INJECTION INTRAMUSCULAR; INTRAVENOUS ONCE AS NEEDED
Status: CANCELLED | OUTPATIENT
Start: 2024-08-14

## 2024-08-14 RX ORDER — NIFEDIPINE 10 MG/1
10 CAPSULE ORAL ONCE AS NEEDED
Status: DISCONTINUED | OUTPATIENT
Start: 2024-08-14 | End: 2024-08-15 | Stop reason: HOSPADM

## 2024-08-14 RX ORDER — SODIUM CHLORIDE, SODIUM LACTATE, POTASSIUM CHLORIDE, CALCIUM CHLORIDE 600; 310; 30; 20 MG/100ML; MG/100ML; MG/100ML; MG/100ML
75 INJECTION, SOLUTION INTRAVENOUS CONTINUOUS
Status: CANCELLED | OUTPATIENT
Start: 2024-08-14

## 2024-08-14 RX ORDER — FAMOTIDINE 20 MG/1
20 TABLET, FILM COATED ORAL 2 TIMES DAILY
Status: DISCONTINUED | OUTPATIENT
Start: 2024-08-14 | End: 2024-08-15

## 2024-08-14 RX ORDER — ONDANSETRON 4 MG/1
4 TABLET, FILM COATED ORAL EVERY 6 HOURS PRN
Status: CANCELLED | OUTPATIENT
Start: 2024-08-14

## 2024-08-14 RX ORDER — METOCLOPRAMIDE 10 MG/1
10 TABLET ORAL EVERY 6 HOURS PRN
Status: CANCELLED | OUTPATIENT
Start: 2024-08-14

## 2024-08-14 RX ORDER — SIMETHICONE 80 MG
80 TABLET,CHEWABLE ORAL 4 TIMES DAILY PRN
Status: CANCELLED | OUTPATIENT
Start: 2024-08-14

## 2024-08-14 RX ORDER — POLYETHYLENE GLYCOL 3350 17 G/17G
17 POWDER, FOR SOLUTION ORAL 2 TIMES DAILY PRN
Status: CANCELLED | OUTPATIENT
Start: 2024-08-14

## 2024-08-14 RX ORDER — LABETALOL HYDROCHLORIDE 5 MG/ML
20 INJECTION, SOLUTION INTRAVENOUS ONCE AS NEEDED
Status: CANCELLED | OUTPATIENT
Start: 2024-08-14

## 2024-08-14 RX ORDER — FAMOTIDINE 10 MG/ML
20 INJECTION INTRAVENOUS DAILY
Status: DISCONTINUED | OUTPATIENT
Start: 2024-08-14 | End: 2024-08-15

## 2024-08-14 RX ORDER — LABETALOL HYDROCHLORIDE 5 MG/ML
20 INJECTION, SOLUTION INTRAVENOUS ONCE AS NEEDED
Status: DISCONTINUED | OUTPATIENT
Start: 2024-08-14 | End: 2024-08-15 | Stop reason: HOSPADM

## 2024-08-14 RX ORDER — METOCLOPRAMIDE HYDROCHLORIDE 5 MG/ML
10 INJECTION INTRAMUSCULAR; INTRAVENOUS EVERY 6 HOURS PRN
Status: CANCELLED | OUTPATIENT
Start: 2024-08-14

## 2024-08-14 RX ORDER — BETAMETHASONE SODIUM PHOSPHATE AND BETAMETHASONE ACETATE 3; 3 MG/ML; MG/ML
12 INJECTION, SUSPENSION INTRA-ARTICULAR; INTRALESIONAL; INTRAMUSCULAR; SOFT TISSUE EVERY 24 HOURS
Status: COMPLETED | OUTPATIENT
Start: 2024-08-14 | End: 2024-08-15

## 2024-08-14 RX ORDER — LIDOCAINE HYDROCHLORIDE 10 MG/ML
0.5 INJECTION INFILTRATION; PERINEURAL ONCE AS NEEDED
Status: DISCONTINUED | OUTPATIENT
Start: 2024-08-14 | End: 2024-08-15 | Stop reason: HOSPADM

## 2024-08-14 RX ORDER — CALCIUM GLUCONATE 98 MG/ML
1 INJECTION, SOLUTION INTRAVENOUS ONCE AS NEEDED
Status: DISCONTINUED | OUTPATIENT
Start: 2024-08-14 | End: 2024-08-15 | Stop reason: HOSPADM

## 2024-08-14 RX ORDER — HYDRALAZINE HYDROCHLORIDE 20 MG/ML
5 INJECTION INTRAMUSCULAR; INTRAVENOUS ONCE AS NEEDED
Status: DISCONTINUED | OUTPATIENT
Start: 2024-08-14 | End: 2024-08-15 | Stop reason: HOSPADM

## 2024-08-14 RX ORDER — ADHESIVE BANDAGE
10 BANDAGE TOPICAL
Status: CANCELLED | OUTPATIENT
Start: 2024-08-14

## 2024-08-14 RX ORDER — LIDOCAINE HYDROCHLORIDE 10 MG/ML
0.5 INJECTION INFILTRATION; PERINEURAL ONCE AS NEEDED
Status: CANCELLED | OUTPATIENT
Start: 2024-08-14

## 2024-08-14 RX ORDER — ONDANSETRON HYDROCHLORIDE 2 MG/ML
4 INJECTION, SOLUTION INTRAVENOUS EVERY 6 HOURS PRN
Status: CANCELLED | OUTPATIENT
Start: 2024-08-14

## 2024-08-14 RX ORDER — SODIUM CHLORIDE, SODIUM LACTATE, POTASSIUM CHLORIDE, CALCIUM CHLORIDE 600; 310; 30; 20 MG/100ML; MG/100ML; MG/100ML; MG/100ML
75 INJECTION, SOLUTION INTRAVENOUS CONTINUOUS
Status: DISCONTINUED | OUTPATIENT
Start: 2024-08-14 | End: 2024-08-15

## 2024-08-14 SDOH — HEALTH STABILITY: MENTAL HEALTH: WERE YOU ABLE TO COMPLETE ALL THE BEHAVIORAL HEALTH SCREENINGS?: YES

## 2024-08-14 SDOH — HEALTH STABILITY: MENTAL HEALTH: NON-SPECIFIC ACTIVE SUICIDAL THOUGHTS (PAST 1 MONTH): NO

## 2024-08-14 SDOH — HEALTH STABILITY: MENTAL HEALTH: SUICIDAL BEHAVIOR (LIFETIME): NO

## 2024-08-14 SDOH — HEALTH STABILITY: MENTAL HEALTH: WISH TO BE DEAD (PAST 1 MONTH): NO

## 2024-08-14 SDOH — SOCIAL STABILITY: SOCIAL INSECURITY: ARE YOU OR HAVE YOU BEEN THREATENED OR ABUSED PHYSICALLY, EMOTIONALLY, OR SEXUALLY BY ANYONE?: NO

## 2024-08-14 SDOH — SOCIAL STABILITY: SOCIAL INSECURITY: ABUSE SCREEN: ADULT

## 2024-08-14 SDOH — SOCIAL STABILITY: SOCIAL INSECURITY: HAVE YOU HAD ANY THOUGHTS OF HARMING ANYONE ELSE?: NO

## 2024-08-14 SDOH — SOCIAL STABILITY: SOCIAL INSECURITY: HAS ANYONE EVER THREATENED TO HURT YOUR FAMILY OR YOUR PETS?: NO

## 2024-08-14 SDOH — SOCIAL STABILITY: SOCIAL INSECURITY: ARE THERE ANY APPARENT SIGNS OF INJURIES/BEHAVIORS THAT COULD BE RELATED TO ABUSE/NEGLECT?: NO

## 2024-08-14 SDOH — HEALTH STABILITY: MENTAL HEALTH: HAVE YOU USED ANY PRESCRIPTION DRUGS OTHER THAN PRESCRIBED IN THE PAST 12 MONTHS?: NO

## 2024-08-14 SDOH — ECONOMIC STABILITY: HOUSING INSECURITY: DO YOU FEEL UNSAFE GOING BACK TO THE PLACE WHERE YOU ARE LIVING?: NO

## 2024-08-14 SDOH — SOCIAL STABILITY: SOCIAL INSECURITY: PHYSICAL ABUSE: DENIES

## 2024-08-14 SDOH — HEALTH STABILITY: MENTAL HEALTH: HAVE YOU USED ANY SUBSTANCES (CANABIS, COCAINE, HEROIN, HALLUCINOGENS, INHALANTS, ETC.) IN THE PAST 12 MONTHS?: NO

## 2024-08-14 SDOH — SOCIAL STABILITY: SOCIAL INSECURITY: DO YOU FEEL ANYONE HAS EXPLOITED OR TAKEN ADVANTAGE OF YOU FINANCIALLY OR OF YOUR PERSONAL PROPERTY?: NO

## 2024-08-14 SDOH — SOCIAL STABILITY: SOCIAL INSECURITY: DOES ANYONE TRY TO KEEP YOU FROM HAVING/CONTACTING OTHER FRIENDS OR DOING THINGS OUTSIDE YOUR HOME?: NO

## 2024-08-14 SDOH — ECONOMIC STABILITY: TRANSPORTATION INSECURITY
IN THE PAST 12 MONTHS, HAS THE LACK OF TRANSPORTATION KEPT YOU FROM MEDICAL APPOINTMENTS OR FROM GETTING MEDICATIONS?: NO

## 2024-08-14 SDOH — ECONOMIC STABILITY: TRANSPORTATION INSECURITY
IN THE PAST 12 MONTHS, HAS LACK OF TRANSPORTATION KEPT YOU FROM MEETINGS, WORK, OR FROM GETTING THINGS NEEDED FOR DAILY LIVING?: NO

## 2024-08-14 SDOH — SOCIAL STABILITY: SOCIAL INSECURITY: VERBAL ABUSE: DENIES

## 2024-08-14 SDOH — SOCIAL STABILITY: SOCIAL INSECURITY: HAVE YOU HAD THOUGHTS OF HARMING ANYONE ELSE?: NO

## 2024-08-14 ASSESSMENT — LIFESTYLE VARIABLES
AUDIT-C TOTAL SCORE: 0
AUDIT-C TOTAL SCORE: 0
HOW OFTEN DO YOU HAVE A DRINK CONTAINING ALCOHOL: NEVER
SKIP TO QUESTIONS 9-10: 1
HOW OFTEN DO YOU HAVE 6 OR MORE DRINKS ON ONE OCCASION: NEVER
HOW MANY STANDARD DRINKS CONTAINING ALCOHOL DO YOU HAVE ON A TYPICAL DAY: PATIENT DOES NOT DRINK

## 2024-08-14 ASSESSMENT — SOCIAL DETERMINANTS OF HEALTH (SDOH)
WITHIN THE LAST YEAR, HAVE TO BEEN RAPED OR FORCED TO HAVE ANY KIND OF SEXUAL ACTIVITY BY YOUR PARTNER OR EX-PARTNER?: NO
WITHIN THE LAST YEAR, HAVE YOU BEEN KICKED, HIT, SLAPPED, OR OTHERWISE PHYSICALLY HURT BY YOUR PARTNER OR EX-PARTNER?: NO
WITHIN THE LAST YEAR, HAVE YOU BEEN HUMILIATED OR EMOTIONALLY ABUSED IN OTHER WAYS BY YOUR PARTNER OR EX-PARTNER?: NO
HOW HARD IS IT FOR YOU TO PAY FOR THE VERY BASICS LIKE FOOD, HOUSING, MEDICAL CARE, AND HEATING?: NOT HARD AT ALL
WITHIN THE LAST YEAR, HAVE YOU BEEN AFRAID OF YOUR PARTNER OR EX-PARTNER?: NO

## 2024-08-14 ASSESSMENT — PAIN SCALES - GENERAL
PAINLEVEL_OUTOF10: 0 - NO PAIN
PAINLEVEL_OUTOF10: 6

## 2024-08-14 ASSESSMENT — PAIN - FUNCTIONAL ASSESSMENT
PAIN_FUNCTIONAL_ASSESSMENT: 0-10

## 2024-08-14 ASSESSMENT — PATIENT HEALTH QUESTIONNAIRE - PHQ9
1. LITTLE INTEREST OR PLEASURE IN DOING THINGS: NOT AT ALL
SUM OF ALL RESPONSES TO PHQ9 QUESTIONS 1 & 2: 0
2. FEELING DOWN, DEPRESSED OR HOPELESS: NOT AT ALL

## 2024-08-14 ASSESSMENT — ACTIVITIES OF DAILY LIVING (ADL): LACK_OF_TRANSPORTATION: PATIENT DECLINED

## 2024-08-14 NOTE — LETTER
August 15, 2024     Patient: Keyla Lane   YOB: 2000   Date of Visit: 8/14/2024       To Whom It May Concern:    Keyla Lane was admitted to the hospital 8/14/2024 - 8/15/2024. Please excuse Keyla for her absence from work on these days for hospital admission.    If you have any questions or concerns, please don't hesitate to call.         Sincerely,       Lara Juarez MD

## 2024-08-14 NOTE — TELEPHONE ENCOUNTER
Called patient to discuss concerns about LUQ pain. Verified by name and date of birth. Patient expressed pain when bending down to pick anything up and has sharp pain bilaterally. Reports that this LUQ is chronic and something she has been experiencing before pregnancy. Reports that she had mild splenomegaly found by her hematologist and had a scan performed that was benign. States baby was moving normally last night but has decreased movement this morning. Denies vaginal bleeding or loss of fluid. Encouraged patient to drink a large glass of orange juice to see if baby becomes active after and if baby is still not moving regularly to present to OB triage for further evaluation. Will call patient in 1 hour to follow up. All questions and concerns were addressed at time of call.     Nyasia Carter MSN, RN, CLC

## 2024-08-14 NOTE — TELEPHONE ENCOUNTER
Cydney contacting office to report that she felt soft movement but not as much as usual after drinking orange juice, patient encouraged to present to triage for evaluation. All questions and concerns were addressed.  Nyasia Carter MSN, RN, CLC

## 2024-08-14 NOTE — H&P
OB Admission H&P    ASSESSMENT & PLAN: Keyla Lane is a 24 y.o.  at 26w2d who presents to triage with LUQ pain and decreased fetal movement, admitted for monitoring of non reassuring fetal heart tracing.    Plan    Admit for prolonged monitoring in s/o recurrent variables and dFM  - reporting dFM in the last 24 hours  - breech presentation and ARMOND completed by Dr Cavanaugh  - recurrent variables and ctx on TOCO  - SSE: /-3    LUQ pain  - present prior to pregnancy but worsening over last 2-3 days  - normal spleen US completed by sonu, following for thrombocytosis  - last PLTs 389  - Pepcid, Flexeril given    IUP at 26w2d  - NST cat II tracing in s/o recurrent variables  - Good fetal movement  - for Mg, BMZ    Maternal Well-being  - Vital signs stable and WNL  - All questions and concerns addressed    Dispo  - admit to L&D for prolonged monitoring   - report given to night team      -Discussed plan and reviewed with: Prisca Aquino and Dr. Mary Sosa  [facility],       Discussed plan and reviewed tracing with Dr. Tyler Aquino, CARLOTA-CNP        Subjective   Pt presents with concerns for decreased fetal movement and acute worsening of chronic LUQ pain. She reports LUQ pain started prior to pregnancy, and her hematologist had a LUQ US that was wnl. The pain has worsened in the past few days and is not relieved by tylenol. She has previously felt fetus move consistently, but since last night she has only felt two kicks. She tried orange juice and a warm bath, which resulted in minimal increase in fetal movement. Pt denies VB, LoF, ctx or hx of abdominal trauma.     Prenatal Provider Dr. Gil        Pregnancy Problems (from 24 to present)       Problem Noted Resolved    Decreased fetal movements in second trimester ([facility]) 2024 by CARLOTA Montejo-CNP No    Iron deficiency anemia during pregnancy ([facility]) 2024 by Kristin Gil MD No    Overview  Signed 2024  3:48 PM by Kristin Gil MD     - Follows with hematologist at [facility]  - Unable to tolerate oral iron  - Interested in pursuing IV iron transfusion         Sciatic nerve pain, left 2024 by Kristin Gil MD No    Overview Signed 2024  4:02 PM by Kristin Gil MD     - Reviewed sciatic nerve pain in pregnancy  - Discussed stretches, declines chiropractor/PT  - Reviewed precautions for DVT given one sided pain - no erythema or edema on exam         Thrombocytosis 2024 by Kristin Gil MD No    Migraines 2024 by Kristin Gil MD No    Overview Signed 2024  5:14 PM by Kristin Gil MD     - Follows with neurologist at [facility]         Supervision of other normal pregnancy, antepartum ([facility]) 2024 by Kristin Gil MD No    Overview Addendum 2024  8:18 PM by SAVANA Klein     [x] Initial BMI: 20  [x] Dating US: 8w  [x] Prenatal Labs: NEON 3/25  [x] Pap: NEON   [x] Genetic Screening: cffDNA ordered   [x] bASA: Not needed  [x] Anatomy US:   [x] 1hr GCT at 24-28wks:  [] Tdap (27-36wks):  [] Flu/COVID:   [x] Rhogam (if Rh neg): N/A  [] GBS at 36 wks:  [] Breastfeeding:   [] Postpartum Birth control method:   [] 39 weeks discussion of IOL vs. Expectant management:  [] Mode of delivery:           Ovarian cyst affecting pregnancy in first trimester, antepartum ([facility]) 2024 by Kristin Gil MD 2024 by Kristin Gil MD    Overview Signed 2024  5:17 PM by Kristin Gil MD     - Torsion precautions reviewed  - Will reexamine at NT scheduled                  OB History    Para Term  AB Living   2 1 1 0 0 1   SAB IAB Ectopic Multiple Live Births   0 0 0 0 1      # Outcome Date GA Lbr Pako/2nd Weight Sex Type Anes PTL Lv   2 Current            1 Term 19    M    LAUREANO       No past surgical history on file.    Social History     Tobacco Use    Smoking status: Never     Smokeless tobacco: Never   Substance Use Topics    Alcohol use: Not Currently     Comment: Occasionally- Birthdays/Celebrations       No Known Allergies    Medications Prior to Admission   Medication Sig Dispense Refill Last Dose    cyproheptadine (Periactin) 4 mg tablet TAKE 1 TABLET (4 MG) BY MOUTH 2 TIMES A DAY AS NEEDED (HEADACHE). 180 tablet 1 2024    eletriptan (Relpax) 40 mg tablet Take 0.5 tablets (20 mg) by mouth 1 time if needed for headaches.   Unknown    ferrous sulfate, dried 160 mg (50 mg iron) ER tablet Take 1 tablet (47.5 mg) by mouth once daily.   Unknown    magnesium oxide 400 mg magnesium capsule TAKE 1 CAPSULE BY MOUTH ONCE DAILY.**NOT COVERED 90 capsule 1 Unknown    Se-Gaurav 19 Chewable 29 mg iron- 1 mg tablet,chewable Chew 1 tablet once daily. 30 tablet 3 Unknown     Objective     Last Vitals  Temp Pulse Resp BP MAP O2 Sat   36.4 °C (97.5 °F) 108 16 118/70 88 100 %       Physical Exam  General: NAD, mood appropriate  Cardiopulmonary: warm and well perfused, breathing comfortably on room air  Abdomen: Gravid, non-tender  Extremities: Symmetric without significant edema  Cervix: Fingertip /50 /-3      Fetal Monitoring  Baseline: 150 bpm, Variability: Moderate, Accelerations:  minimal  and Decelerations: Yes, recurrent variables  Uterine Activity: Contractions present, q5 minutes  Interpretation: Category 2    Bedside Ultrasound: YES, Findingsadequate amniotic fluid, footling breech    Labs in chart were reviewed.  Results from last 7 days   Lab Units 24  1314   AST U/L 10   ALT U/L 7   CREATININE mg/dL 0.43*

## 2024-08-14 NOTE — TELEPHONE ENCOUNTER
Called to check in with patient and assess fetal movement. Left voicemail encouraging patient to call back.  Nyasia Carter MSN, RN, CLC

## 2024-08-14 NOTE — CARE PLAN
Problem: Antepartum  Goal: Maintain pregnancy as long as maternal and/or fetal condition is stable  Outcome: Progressing  Goal: Avoid/minimize constipation  Outcome: Progressing  Goal: No decrease in circulation/VTE  Outcome: Progressing  Goal: Minimize anxiety/maximize coping  Outcome: Progressing     Problem: Pain - Adult  Goal: Verbalizes/displays adequate comfort level or baseline comfort level  Outcome: Progressing     Problem: Safety - Adult  Goal: Free from fall injury  Outcome: Progressing

## 2024-08-15 ENCOUNTER — APPOINTMENT (OUTPATIENT)
Dept: RADIOLOGY | Facility: HOSPITAL | Age: 24
DRG: 832 | End: 2024-08-15
Payer: COMMERCIAL

## 2024-08-15 ENCOUNTER — APPOINTMENT (OUTPATIENT)
Dept: CARDIOLOGY | Facility: HOSPITAL | Age: 24
End: 2024-08-15
Payer: COMMERCIAL

## 2024-08-15 ENCOUNTER — HOSPITAL ENCOUNTER (INPATIENT)
Facility: HOSPITAL | Age: 24
End: 2024-08-15
Attending: STUDENT IN AN ORGANIZED HEALTH CARE EDUCATION/TRAINING PROGRAM | Admitting: STUDENT IN AN ORGANIZED HEALTH CARE EDUCATION/TRAINING PROGRAM
Payer: COMMERCIAL

## 2024-08-15 VITALS
TEMPERATURE: 98.4 F | RESPIRATION RATE: 18 BRPM | HEIGHT: 63 IN | BODY MASS INDEX: 25.82 KG/M2 | DIASTOLIC BLOOD PRESSURE: 69 MMHG | SYSTOLIC BLOOD PRESSURE: 106 MMHG | OXYGEN SATURATION: 97 % | WEIGHT: 145.72 LBS | HEART RATE: 113 BPM

## 2024-08-15 DIAGNOSIS — R00.0 TACHYCARDIA: Primary | ICD-10-CM

## 2024-08-15 PROBLEM — O36.8190 DECREASED FETAL MOVEMENT AFFECTING MANAGEMENT OF MOTHER, ANTEPARTUM (HHS-HCC): Status: ACTIVE | Noted: 2024-08-15

## 2024-08-15 LAB
APTT PPP: 24 SECONDS (ref 27–38)
ATRIAL RATE: 110 BPM
ERYTHROCYTE [DISTWIDTH] IN BLOOD BY AUTOMATED COUNT: 14.5 % (ref 11.5–14.5)
FIBRINOGEN PPP-MCNC: 294 MG/DL (ref 200–400)
HCT VFR BLD AUTO: 29 % (ref 36–46)
HGB BLD-MCNC: 9.4 G/DL (ref 12–16)
INR PPP: 0.9 (ref 0.9–1.1)
MAGNESIUM SERPL-MCNC: 1.74 MG/DL (ref 1.6–2.4)
MCH RBC QN AUTO: 24.2 PG (ref 26–34)
MCHC RBC AUTO-ENTMCNC: 32.4 G/DL (ref 32–36)
MCV RBC AUTO: 75 FL (ref 80–100)
NRBC BLD-RTO: 0 /100 WBCS (ref 0–0)
P AXIS: 39 DEGREES
P OFFSET: 200 MS
P ONSET: 150 MS
PLATELET # BLD AUTO: 417 X10*3/UL (ref 150–450)
PR INTERVAL: 158 MS
PROTHROMBIN TIME: 10.1 SECONDS (ref 9.8–12.8)
Q ONSET: 229 MS
QRS COUNT: 19 BEATS
QRS DURATION: 66 MS
QT INTERVAL: 350 MS
QTC CALCULATION(BAZETT): 473 MS
QTC FREDERICIA: 428 MS
R AXIS: 18 DEGREES
RBC # BLD AUTO: 3.88 X10*6/UL (ref 4–5.2)
T AXIS: 12 DEGREES
T OFFSET: 404 MS
TSH SERPL-ACNC: 1.9 MIU/L (ref 0.44–3.98)
VENTRICULAR RATE: 110 BPM
WBC # BLD AUTO: 20.7 X10*3/UL (ref 4.4–11.3)

## 2024-08-15 PROCEDURE — 1220000001 HC OB SEMI-PRIVATE ROOM DAILY

## 2024-08-15 PROCEDURE — 76816 OB US FOLLOW-UP PER FETUS: CPT | Performed by: STUDENT IN AN ORGANIZED HEALTH CARE EDUCATION/TRAINING PROGRAM

## 2024-08-15 PROCEDURE — 85384 FIBRINOGEN ACTIVITY: CPT | Performed by: STUDENT IN AN ORGANIZED HEALTH CARE EDUCATION/TRAINING PROGRAM

## 2024-08-15 PROCEDURE — 76819 FETAL BIOPHYS PROFIL W/O NST: CPT | Performed by: STUDENT IN AN ORGANIZED HEALTH CARE EDUCATION/TRAINING PROGRAM

## 2024-08-15 PROCEDURE — G0378 HOSPITAL OBSERVATION PER HR: HCPCS

## 2024-08-15 PROCEDURE — 85027 COMPLETE CBC AUTOMATED: CPT

## 2024-08-15 PROCEDURE — 99199 UNLISTED SPECIAL SVC PX/RPRT: CPT

## 2024-08-15 PROCEDURE — 93005 ELECTROCARDIOGRAM TRACING: CPT

## 2024-08-15 PROCEDURE — 85610 PROTHROMBIN TIME: CPT

## 2024-08-15 PROCEDURE — 76816 OB US FOLLOW-UP PER FETUS: CPT

## 2024-08-15 PROCEDURE — 2500000004 HC RX 250 GENERAL PHARMACY W/ HCPCS (ALT 636 FOR OP/ED): Performed by: FAMILY MEDICINE

## 2024-08-15 PROCEDURE — 93010 ELECTROCARDIOGRAM REPORT: CPT | Performed by: INTERNAL MEDICINE

## 2024-08-15 PROCEDURE — 99255 IP/OBS CONSLTJ NEW/EST HI 80: CPT | Performed by: STUDENT IN AN ORGANIZED HEALTH CARE EDUCATION/TRAINING PROGRAM

## 2024-08-15 PROCEDURE — 36415 COLL VENOUS BLD VENIPUNCTURE: CPT | Performed by: STUDENT IN AN ORGANIZED HEALTH CARE EDUCATION/TRAINING PROGRAM

## 2024-08-15 PROCEDURE — 76819 FETAL BIOPHYS PROFIL W/O NST: CPT

## 2024-08-15 PROCEDURE — 2500000004 HC RX 250 GENERAL PHARMACY W/ HCPCS (ALT 636 FOR OP/ED)

## 2024-08-15 PROCEDURE — 2550000001 HC RX 255 CONTRASTS: Performed by: STUDENT IN AN ORGANIZED HEALTH CARE EDUCATION/TRAINING PROGRAM

## 2024-08-15 PROCEDURE — 71275 CT ANGIOGRAPHY CHEST: CPT

## 2024-08-15 PROCEDURE — 36415 COLL VENOUS BLD VENIPUNCTURE: CPT

## 2024-08-15 PROCEDURE — 71275 CT ANGIOGRAPHY CHEST: CPT | Performed by: RADIOLOGY

## 2024-08-15 PROCEDURE — 76705 ECHO EXAM OF ABDOMEN: CPT

## 2024-08-15 PROCEDURE — 96376 TX/PRO/DX INJ SAME DRUG ADON: CPT

## 2024-08-15 PROCEDURE — 76705 ECHO EXAM OF ABDOMEN: CPT | Performed by: RADIOLOGY

## 2024-08-15 PROCEDURE — 2500000001 HC RX 250 WO HCPCS SELF ADMINISTERED DRUGS (ALT 637 FOR MEDICARE OP)

## 2024-08-15 RX ORDER — METOCLOPRAMIDE 10 MG/1
10 TABLET ORAL EVERY 6 HOURS PRN
Status: DISCONTINUED | OUTPATIENT
Start: 2024-08-15 | End: 2024-08-15 | Stop reason: HOSPADM

## 2024-08-15 RX ORDER — METOCLOPRAMIDE HYDROCHLORIDE 5 MG/ML
10 INJECTION INTRAMUSCULAR; INTRAVENOUS EVERY 6 HOURS PRN
Status: DISCONTINUED | OUTPATIENT
Start: 2024-08-15 | End: 2024-08-15 | Stop reason: HOSPADM

## 2024-08-15 RX ORDER — NIFEDIPINE 10 MG/1
10 CAPSULE ORAL ONCE AS NEEDED
Status: DISCONTINUED | OUTPATIENT
Start: 2024-08-15 | End: 2024-08-15 | Stop reason: HOSPADM

## 2024-08-15 RX ORDER — HYDRALAZINE HYDROCHLORIDE 20 MG/ML
5 INJECTION INTRAMUSCULAR; INTRAVENOUS ONCE AS NEEDED
Status: DISCONTINUED | OUTPATIENT
Start: 2024-08-15 | End: 2024-08-15 | Stop reason: HOSPADM

## 2024-08-15 RX ORDER — SIMETHICONE 80 MG
80 TABLET,CHEWABLE ORAL 4 TIMES DAILY PRN
Status: DISCONTINUED | OUTPATIENT
Start: 2024-08-15 | End: 2024-08-15 | Stop reason: HOSPADM

## 2024-08-15 RX ORDER — SODIUM CHLORIDE, SODIUM LACTATE, POTASSIUM CHLORIDE, CALCIUM CHLORIDE 600; 310; 30; 20 MG/100ML; MG/100ML; MG/100ML; MG/100ML
125 INJECTION, SOLUTION INTRAVENOUS CONTINUOUS
Status: DISCONTINUED | OUTPATIENT
Start: 2024-08-15 | End: 2024-08-15 | Stop reason: HOSPADM

## 2024-08-15 RX ORDER — ONDANSETRON HYDROCHLORIDE 2 MG/ML
4 INJECTION, SOLUTION INTRAVENOUS EVERY 6 HOURS PRN
Status: DISCONTINUED | OUTPATIENT
Start: 2024-08-15 | End: 2024-08-15 | Stop reason: HOSPADM

## 2024-08-15 RX ORDER — POLYETHYLENE GLYCOL 3350 17 G/17G
17 POWDER, FOR SOLUTION ORAL 2 TIMES DAILY PRN
Status: DISCONTINUED | OUTPATIENT
Start: 2024-08-15 | End: 2024-08-15 | Stop reason: HOSPADM

## 2024-08-15 RX ORDER — ONDANSETRON 4 MG/1
4 TABLET, FILM COATED ORAL EVERY 6 HOURS PRN
Status: DISCONTINUED | OUTPATIENT
Start: 2024-08-15 | End: 2024-08-15 | Stop reason: HOSPADM

## 2024-08-15 RX ORDER — LABETALOL HYDROCHLORIDE 5 MG/ML
20 INJECTION, SOLUTION INTRAVENOUS ONCE AS NEEDED
Status: DISCONTINUED | OUTPATIENT
Start: 2024-08-15 | End: 2024-08-15 | Stop reason: HOSPADM

## 2024-08-15 RX ORDER — BISACODYL 10 MG/1
10 SUPPOSITORY RECTAL DAILY PRN
Status: DISCONTINUED | OUTPATIENT
Start: 2024-08-15 | End: 2024-08-15 | Stop reason: HOSPADM

## 2024-08-15 RX ORDER — FAMOTIDINE 20 MG/1
20 TABLET, FILM COATED ORAL 2 TIMES DAILY PRN
Status: DISCONTINUED | OUTPATIENT
Start: 2024-08-15 | End: 2024-08-15 | Stop reason: HOSPADM

## 2024-08-15 RX ORDER — ADHESIVE BANDAGE
10 BANDAGE TOPICAL
Status: DISCONTINUED | OUTPATIENT
Start: 2024-08-15 | End: 2024-08-15 | Stop reason: HOSPADM

## 2024-08-15 RX ORDER — LIDOCAINE HYDROCHLORIDE 10 MG/ML
0.5 INJECTION INFILTRATION; PERINEURAL ONCE AS NEEDED
Status: DISCONTINUED | OUTPATIENT
Start: 2024-08-15 | End: 2024-08-15 | Stop reason: HOSPADM

## 2024-08-15 RX ORDER — MAGNESIUM SULFATE HEPTAHYDRATE 40 MG/ML
2 INJECTION, SOLUTION INTRAVENOUS ONCE
Status: DISCONTINUED | OUTPATIENT
Start: 2024-08-15 | End: 2024-08-15

## 2024-08-15 ASSESSMENT — PAIN SCALES - GENERAL
PAINLEVEL_OUTOF10: 0 - NO PAIN

## 2024-08-15 ASSESSMENT — PAIN - FUNCTIONAL ASSESSMENT
PAIN_FUNCTIONAL_ASSESSMENT: 0-10

## 2024-08-15 NOTE — HOSPITAL COURSE
Keyla Lane is a 24 y.o.  at 26w3d by 8wk US admitted. Patient presented to OB Triage with decreased fetal movement and had recurrent variables on CEFM. Patient was admitted for prolonged monitoring. Patient received IVF, 12hrs Mg, and BMZ x2. Prolonged fetal monitoring was reassuring and appropriate for gestational age, with recovery of variable decels. BPP 8/8, normal AFV, and appropriately grown.    Patient also reported worsening LUQ discomfort, in the setting of a prior history of LUQ discomfort and thrombocytosis (follows with hematologist at [facility]). Patient had a normal spleen ultrasound in 2023. Spleen US completed prior to discharge unremarkable.      Patient also with intermittent tachycardia throughout this admission. Patient asymptomatic. Received IV fluids with little recovery of heart rate. EKG consistent with sinus tachycardia, QTC normal. TSH, electrolytes also within normal limits. CT PE negative for PE. Patient will be discharged with plan for Holter and Cardiology consultation as an outpatient.

## 2024-08-15 NOTE — CONSULTS
"MFM Consult    Reason for Consult: NRF    HPI:   Per admission HPI: \"Pt presents with concerns for decreased fetal movement and acute worsening of chronic LUQ pain. She reports LUQ pain started prior to pregnancy, and her hematologist had a LUQ US that was wnl. The pain has worsened in the past few days and is not relieved by tylenol. She has previously felt fetus move consistently, but since last night she has only felt two kicks. She tried orange juice and a warm bath, which resulted in minimal increase in fetal movement. Pt denies VB, LoF, ctx or hx of abdominal trauma.\"    This AM: Patient has no complaints this morning. Has been intermittently tachycardic. Denies CP. Denies painful contractions, VB, LOF. Reports fetal movement is back to normal baseline. States she has some LUQ discomfort still, but denies abdominal tenderness otherwise.     Pregnancy Problems (from 04/30/24 to present)       Problem Noted Resolved    Decreased fetal movements in second trimester ([facility]) 8/14/2024 by CARLOTA Montejo-CNP No    Iron deficiency anemia during pregnancy ([facility]) 7/25/2024 by Kristin Gil MD No    Overview Signed 7/25/2024  3:48 PM by Kristin Gil MD     - Follows with hematologist at [facility]  - Unable to tolerate oral iron  - Interested in pursuing IV iron transfusion         Sciatic nerve pain, left 7/25/2024 by Kristin Gil MD No    Overview Signed 7/25/2024  4:02 PM by Kristin Gil MD     - Reviewed sciatic nerve pain in pregnancy  - Discussed stretches, declines chiropractor/PT  - Reviewed precautions for DVT given one sided pain - no erythema or edema on exam         Thrombocytosis 4/30/2024 by Kristin Gil MD No    Migraines 4/30/2024 by Kristin Gil MD No    Overview Signed 4/30/2024  5:14 PM by Kristin Gil MD     - Follows with neurologist at [facility]         Supervision of other normal pregnancy, antepartum ([facility]) 4/30/2024 by Kristin Gil MD No "    Overview Addendum 2024  8:18 PM by CARLOTA Klein-LORENM     [x] Initial BMI: 20  [x] Dating US: 8w  [x] Prenatal Labs: NEON 3/25  [x] Pap: NEON   [x] Genetic Screening: cffDNA ordered   [x] bASA: Not needed  [x] Anatomy US:   [x] 1hr GCT at 24-28wks:  [] Tdap (27-36wks):  [] Flu/COVID:   [x] Rhogam (if Rh neg): N/A  [] GBS at 36 wks:  [] Breastfeeding:   [] Postpartum Birth control method:   [] 39 weeks discussion of IOL vs. Expectant management:  [] Mode of delivery:           Ovarian cyst affecting pregnancy in first trimester, antepartum ([facility]) 2024 by Kristin Gil MD 2024 by Kristin Gil MD    Overview Signed 2024  5:17 PM by Kristin Gil MD     - Torsion precautions reviewed  - Will reexamine at NT scheduled                  Obstetrical History   OB History          2    Para   1    Term   1            AB        Living   1         SAB        IAB        Ectopic        Multiple        Live Births   1                 Past Medical History  Past Medical History:   Diagnosis Date    Migraine 10/23        Past Surgical History   History reviewed. No pertinent surgical history.    Social History  Social History     Socioeconomic History    Marital status:      Spouse name: Not on file    Number of children: Not on file    Years of education: Not on file    Highest education level: Not on file   Occupational History    Not on file   Tobacco Use    Smoking status: Never    Smokeless tobacco: Never   Vaping Use    Vaping status: Never Used   Substance and Sexual Activity    Alcohol use: Not Currently     Comment: Occasionally- Birthdays/Celebrations    Drug use: Not Currently     Types: Marijuana    Sexual activity: Yes     Partners: Male     Birth control/protection: None   Other Topics Concern    Not on file   Social History Narrative    Not on file     Social Determinants of Health     Financial Resource Strain: Patient Declined  "(2024)    Overall Financial Resource Strain (CARDIA)     Difficulty of Paying Living Expenses: Patient declined   Food Insecurity: No Food Insecurity (2024)    Hunger Vital Sign     Worried About Running Out of Food in the Last Year: Never true     Ran Out of Food in the Last Year: Never true   Transportation Needs: No Transportation Needs (2024)    PRAPARE - Transportation     Lack of Transportation (Medical): No     Lack of Transportation (Non-Medical): No   Physical Activity: Not on file   Stress: Not on file   Social Connections: Not on file   Intimate Partner Violence: Not At Risk (2024)    Humiliation, Afraid, Rape, and Kick questionnaire     Fear of Current or Ex-Partner: No     Emotionally Abused: No     Physically Abused: No     Sexually Abused: No       Allergies  No Known Allergies    Medications  Medications Prior to Admission   Medication Sig Dispense Refill Last Dose    cyproheptadine (Periactin) 4 mg tablet TAKE 1 TABLET (4 MG) BY MOUTH 2 TIMES A DAY AS NEEDED (HEADACHE). 180 tablet 1 2024    eletriptan (Relpax) 40 mg tablet Take 0.5 tablets (20 mg) by mouth 1 time if needed for headaches.   Unknown    ferrous sulfate, dried 160 mg (50 mg iron) ER tablet Take 1 tablet (47.5 mg) by mouth once daily.   Unknown    magnesium oxide 400 mg magnesium capsule TAKE 1 CAPSULE BY MOUTH ONCE DAILY.**NOT COVERED 90 capsule 1 Unknown    Se-Gaurav 19 Chewable 29 mg iron- 1 mg tablet,chewable Chew 1 tablet once daily. 30 tablet 3 Unknown       OBJECTIVE:   /61   Pulse 71   Temp 36.5 °C (97.7 °F) (Temporal)   Resp 18   Ht 1.6 m (5' 3\")   Wt 66.1 kg (145 lb 11.6 oz)   SpO2 98%   BMI 25.81 kg/m²    Temp  Min: 35.7 °C (96.3 °F)  Max: 36.5 °C (97.7 °F)  Pulse  Min: 63  Max: 192  BP  Min: 66/34  Max: 118/64    Physical exam:  General:  AAOx3, No acute distress  Cardiovascular: Warm and well perfused  Respiratory: Normal respiratory effort   Abdominal:  Soft, gravid, LUQ tenderness to " deep palpation, no rebound or guarding, no masses.  Extremities: Warm, well perfused    FHT: Baseline 140, no decelerations for >5 hours, 10x10 accels present  Candor: Quiet     Labs:   Labs in chart were reviewed.  Lab Results   Component Value Date    WBC 15.4 (H) 2024    HGB 10.0 (L) 2024    HCT 31.4 (L) 2024     2024     Lab Results   Component Value Date    APTT 24 (L) 08/15/2024    PROTIME 10.1 08/15/2024    INR 0.9 08/15/2024     Lab Results   Component Value Date    FIBRINOGEN 294 08/15/2024       ASSESSMENT AND PLAN:     24 y.o.  at 26w3d by 8wk US with NRFHT.      NRFHT   Admitted for prolonged monitoring in s/o recurrent variables and decreased FM. SVE 0.5/50/-3 on admission. S/p IVF LR. S/p BMZ #1. S/p 12hrs Mg. Now with reassuring FHT, appropriate for gestational age. BPP  this AM with normal AFV.   - For BMZ #2 today     LUQ pain  Hx of Thrombocytosis   Patient with LUQ discomfort prior to pregnancy, worsening over last 2-3 days. No trauma to abdomen. Afebrile. Flexeril given on presentation. [facility] following for thrombocytosis. Normal spleen US completed 2023 by sonu. Last PLTs 395 8/15 AM. Coag studies on admission wnl. Fibrinogen stable in 290s-300s.    - For spleen US prior to discharge    Sinus tachycardia  Patient with intermittent tachycardia up to 120s, EKG consistent with sinus tachycardia. Patient asymptomatic.   - Continue IVF, s/p 1L bolus with recovery of HR  - Follow up TSH with reflex  - Follow up electrolytes    Routine  - prenatal labs up to date and wnl  - rr cfDNA  - 1hr wnl (123)  - US 8/15: EFW62%, AC 69%, nml AFV  - Anemia of pregnancy: s/p IV iron 24    Dispo: Stable for discharge home after BMZ #2. Will schedule follow up for next week.     Pt seen and discussed with MFM Attending, Dr. Melgar.     JOJO ESPINOZA, MS-4  Medical Student  MFM Team    I have personally seen and examined the patient and performed the medical  decision-making components. I have reviewed the medical student documentation and/or resident documentation and verified the findings in the note as written with additions or exceptions as stated in the body of the note.     Terell is a 23 yo  @ 26w3d admitted for prolonged monitoring for recurrent variables on the fetal monitor. Terell initially presented yesterday afternoon with decreased fetal movement and was found to have recurrent variables on the fetal tracing. She was started on magnesium, given BMTZ and transferred to L&D for prolonged monitoring. Keyla remained on CEFM until early this morning with >4 hours of reactive and reassuring tracing. Growth U/S today showed and EFW of 1013 g (62%) with BPP 8/8. She now reports good fetal movement. Will give second dose of BMTZ and discharge to home.      Of note, terell has a long hx of LUQ pain. She was planning to have her Hematologist (who she sees for thrombocytosis) order an abdominal ultrasound to evaluate her spleen but we will order this today prior to discharge. Keyla has also had intermittent tachycardia since her admission. Her electrolytes are wnl, TSH appropriate and EKG shows sinus tachycardia. Has some SOB but no LE swelling. Will get CT Chest to rule out a PE. If negative will plan for a Holter and Cardiology consultation as an outpatient.     Napoleon Melgar MD  Maternal-Fetal Medicine

## 2024-08-15 NOTE — CARE PLAN
"The patient's goals for the shift include \"stay pregnant\"    The clinical goals for the shift include maintain reassuring FHT, maintain minimal uterine activity without cervical change; Pt will tolerate L&D processes and procedures    Over the shift, the patient made progress toward the relevant goals. Barriers to progression include variable decels. Recommendations to address these barriers include maternal positioning.    "

## 2024-08-15 NOTE — SIGNIFICANT EVENT
Bedside Growth US    Breech presentation    BPD  6.73cm 68%ile  HC  25.00cm 54.1%ile  AC  22.37 57.3%ile  FL  4.97cm 50.7%ile    EFW 990g  58.8%ile    Lara Juarez MD PGY-2

## 2024-08-15 NOTE — DISCHARGE SUMMARY
Discharge Diagnosis  Decreased fetal movement affecting management of mother, antepartum ([facility])    Issues Requiring Follow-Up  Cardiology follow up/holter monitor    Test Results Pending At Discharge  Pending Labs       No current pending labs.            Hospital Course  Keyla Lane is a 24 y.o.  at 26w3d by 8wk US admitted. Patient presented to OB Triage with decreased fetal movement and had recurrent variables on CEFM. Patient was admitted for prolonged monitoring. Patient received IVF, 12hrs Mg, and BMZ x2. Prolonged fetal monitoring was reassuring and appropriate for gestational age, with recovery of variable decels. BPP 8/8, normal AFV, and appropriately grown.    Patient also reported worsening LUQ discomfort, in the setting of a prior history of LUQ discomfort and thrombocytosis (follows with hematologist at [facility]). Patient had a normal spleen ultrasound in 2023. Spleen US completed prior to discharge unremarkable.      Patient also with intermittent tachycardia throughout this admission. Patient asymptomatic. Received IV fluids with little recovery of heart rate. EKG consistent with sinus tachycardia, QTC normal. TSH, electrolytes also within normal limits. CT PE negative for PE. Patient will be discharged with plan for Holter and Cardiology consultation as an outpatient.     Pertinent Physical Exam At Time of Discharge  General:  AAOx3, No acute distress  Cardiovascular: Warm and well perfused  Respiratory: Normal respiratory effort   Abdominal:  Soft, gravid, mild LUQ tenderness to deep palpation, no rebound or guarding, no masses.  Extremities: Warm, well perfused    Home Medications     Medication List      CONTINUE taking these medications     cyproheptadine 4 mg tablet; Commonly known as: Periactin; TAKE 1 TABLET   (4 MG) BY MOUTH 2 TIMES A DAY AS NEEDED (HEADACHE).   eletriptan 40 mg tablet; Commonly known as: Relpax   ferrous sulfate, dried 160 mg (50 mg iron) ER tablet   magnesium  oxide 400 mg magnesium capsule; TAKE 1 CAPSULE BY MOUTH ONCE   DAILY.**NOT COVERED   Se-Gaurav 19 Chewable 29 mg iron- 1 mg tablet,chewable; Generic drug:   prenatal no118-iron-folic acid; Chew 1 tablet once daily.       Outpatient Follow-Up  Future Appointments   Date Time Provider Department Center   2024  1:00 PM [id number] HOLTER/ECG RESOURCE [id number] [facility]   2024  3:30 PM Keiry Carranza APRN-[facility] [id number] Academic   2024  9:00 AM Rhianna Harvey MD [id number] [facility]       Lara Juarez MD

## 2024-08-15 NOTE — DISCHARGE INSTRUCTIONS
Get your Holter Monitor placed tomorrow 8/16 at 1:00pm in [facility] [address].    Please obtain labs prior to your next visit with Dr. Gil on 8/22.

## 2024-08-15 NOTE — CARE PLAN
The patient's goals for the shift include remain pregnant    The clinical goals for the shift include maintain reassuring FHT, resolve Tachycardia.    Over the shift, the patient made progress toward all the following goals and has been cleared for discharge. RN reviewed discharge instructions with patient and patient has a clear understanding of home going care at this time.

## 2024-08-15 NOTE — SIGNIFICANT EVENT
Called to bedside for patient feeling dizzy while blood draw being done. BP 60s/30s    Patient laid back and fluid bolus started.    On evaluation, patient resting, more comfortable. Reporting resolution in dizziness. Did not lose consciousness. Repeat BP improved to 84/50. Patient also given water to drink. BP improved to 104/60. Suspect vasovagal episode.    Will draw labs after fluid bolus.     Lara Juarez MD PGY-2

## 2024-08-16 ENCOUNTER — HOSPITAL ENCOUNTER (OUTPATIENT)
Dept: CARDIOLOGY | Facility: HOSPITAL | Age: 24
Discharge: HOME | End: 2024-08-16
Payer: COMMERCIAL

## 2024-08-16 DIAGNOSIS — R00.0 TACHYCARDIA: ICD-10-CM

## 2024-08-16 PROCEDURE — 93246 EXT ECG>7D<15D RECORDING: CPT

## 2024-08-19 ENCOUNTER — ROUTINE PRENATAL (OUTPATIENT)
Dept: OBSTETRICS AND GYNECOLOGY | Facility: HOSPITAL | Age: 24
End: 2024-08-19
Payer: COMMERCIAL

## 2024-08-19 VITALS — SYSTOLIC BLOOD PRESSURE: 111 MMHG | BODY MASS INDEX: 25.51 KG/M2 | DIASTOLIC BLOOD PRESSURE: 74 MMHG | WEIGHT: 144 LBS

## 2024-08-19 DIAGNOSIS — O99.019 IRON DEFICIENCY ANEMIA DURING PREGNANCY (HHS-HCC): Primary | ICD-10-CM

## 2024-08-19 DIAGNOSIS — D50.9 IRON DEFICIENCY ANEMIA DURING PREGNANCY (HHS-HCC): Primary | ICD-10-CM

## 2024-08-19 PROBLEM — Z3A.27 27 WEEKS GESTATION OF PREGNANCY (HHS-HCC): Status: ACTIVE | Noted: 2024-04-30

## 2024-08-19 LAB
ATRIAL RATE: 110 BPM
P AXIS: 39 DEGREES
P OFFSET: 200 MS
P ONSET: 150 MS
PR INTERVAL: 158 MS
Q ONSET: 229 MS
QRS COUNT: 19 BEATS
QRS DURATION: 66 MS
QT INTERVAL: 350 MS
QTC CALCULATION(BAZETT): 473 MS
QTC FREDERICIA: 428 MS
R AXIS: 18 DEGREES
T AXIS: 12 DEGREES
T OFFSET: 404 MS
VENTRICULAR RATE: 110 BPM

## 2024-08-19 PROCEDURE — 99213 OFFICE O/P EST LOW 20 MIN: CPT | Mod: 25,TH

## 2024-08-19 PROCEDURE — 90715 TDAP VACCINE 7 YRS/> IM: CPT

## 2024-08-19 NOTE — LETTER
8/19/2024    To Whom It May Concern:    This is to certify that my patient,Cydney Lane is under my care for her pregnancy.    The following guidelines may be used for any dental work:  You may use a local anesthetic with or without Epinephrine.  You may prescribe any Penicillin or Cephalosporin if an antibiotic is necessary. (Dependent on allergy history)  You may take x-rays with a lead apron if necessary.  You may prescribe Tylenol and/or narcotics for pain.  You may extract teeth if necessary.    If you need further information or if you have any concerns, please contact our office.    Sincerely,        SAVANA Klein  [facility]

## 2024-08-19 NOTE — PROGRESS NOTES
"Assessment/Plan   24 y.o.  at 27w0d  - Tdap today  - Discussed with pt that Hgb dropped from 10.2 on 7/15 to 9.4 most recently on 8/15, s/p IV Dextran x1 administered on .   - Dr. Melgar messaged via EMR to review chart and provide advice regarding ongoing management for iron deficiency anemia; will plan to repeat CBC at next visit to allow more time for response to therapy.  - Cardiology consult scheduled for   - Routine prenatal care    Follow up in 2 weeks for next prenatal visit. Repeat CBC / Retic.     SAVANA Klein    Subjective     Keyla Lane \"Cydney\" is a 24 y.o.  at 27w0d with a working estimated date of delivery of 2024, by Ultrasound presenting for a routine prenatal visit. She is doing well, no concerns. She denies vaginal bleeding, leakage of fluid, or contractions.    Recent admission -8/15 for decreased fetal movement. Chart reviewed, fetal status reassuring following prolonged monitoring and  BPP. However, pt noted to have intermittent tachycardia during this admission. She was discharged with a Holter monitor (wearing currently) and outpatient cardiology consult.     She endorses \"much more\" fetal movement since her discharge.     Pregnancy Problems (from 24 to present)       Problem Noted Resolved    Decreased fetal movement affecting management of mother, antepartum ([facility]) 8/15/2024 by Christine Kitchen MD No    Priority:  Medium      Decreased fetal movements in second trimester ([facility]) 2024 by CARLOTA Montejo-CNP No    Priority:  Medium      Iron deficiency anemia during pregnancy ([facility]) 2024 by Kristin Gil MD No    Priority:  Medium      Overview Addendum 2024  2:23 PM by SAVANA Klein     - Hgb 10.2 on 7/15  - Unable to tolerate oral iron, received IV Dextran x1 on   - Hgb dropped further to 9.4 on 8/15 while inpatient  - Per Dr. Melgar, repeat CBC / Retic in two weeks to allow more " time to respond to therapy         Sciatic nerve pain, left 2024 by Kristin Gil MD No    Priority:  Medium      Overview Signed 2024  4:02 PM by Kristin Gil MD     - Reviewed sciatic nerve pain in pregnancy  - Discussed stretches, declines chiropractor/PT  - Reviewed precautions for DVT given one sided pain - no erythema or edema on exam         Thrombocytosis 2024 by Kristin Gil MD No    Priority:  Medium      Overview Addendum 2024 12:46 PM by SAVANA Klein     Followed by hematologist Dr. Combs at [facility]; per pt, she was told that next steps for testing would be a bone marrow biopsy, and he recommends deferring this to postpartum period         Migraines 2024 by rKistin Gil MD No    Priority:  Medium      Overview Signed 2024  5:14 PM by Kristin Gil MD     - Follows with neurologist at [facility]         27 weeks gestation of pregnancy ([facility]) 2024 by Kristin Gil MD No    Priority:  Medium      Overview Addendum 2024 12:21 PM by SAVANA Klein     [x] Initial BMI: 20  [x] Dating US: 8w  [x] Prenatal Labs: NEON 3/25  [x] Pap: NEON   [x] Genetic Screening: cffDNA ordered   [x] bASA: Not needed  [x] Anatomy US:   [x] 1hr GCT at 24-28wks:  [x] Tdap (27-36wks): 24  [] Flu/COVID:   [x] Rhogam (if Rh neg): N/A  [] GBS at 36 wks:  [] Breastfeeding:   [] Postpartum Birth control method:   [] 39 weeks discussion of IOL vs. Expectant management:  [] Mode of delivery:           Ovarian cyst affecting pregnancy in first trimester, antepartum ([facility]) 2024 by Kristin Gil MD 2024 by Kristin Gil MD    Overview Signed 2024  5:17 PM by Kristin Gil MD     - Torsion precautions reviewed  - Will reexamine at NT scheduled                            Objective   Weight: 65.3 kg (144 lb)  TW.7 kg (28 lb)   Expected Total Weight Gain: 11.5 kg (25 lb)-16 kg (35 lb)    Pregravid BMI: 20.55  Pregravid Weight: 52.6 kg (116 lb)   BP: 111/74  Fetal Heart Rate: 150 Fundal Height (cm): 28 cm

## 2024-08-29 ENCOUNTER — HOSPITAL ENCOUNTER (OUTPATIENT)
Facility: HOSPITAL | Age: 24
Discharge: HOME | End: 2024-08-29
Attending: OBSTETRICS & GYNECOLOGY | Admitting: OBSTETRICS & GYNECOLOGY
Payer: COMMERCIAL

## 2024-08-29 VITALS
SYSTOLIC BLOOD PRESSURE: 112 MMHG | HEART RATE: 88 BPM | TEMPERATURE: 98.2 F | DIASTOLIC BLOOD PRESSURE: 57 MMHG | WEIGHT: 148.15 LBS | OXYGEN SATURATION: 98 % | BODY MASS INDEX: 26.25 KG/M2 | HEIGHT: 63 IN | RESPIRATION RATE: 16 BRPM

## 2024-08-29 PROCEDURE — 87086 URINE CULTURE/COLONY COUNT: CPT

## 2024-08-29 PROCEDURE — 59025 FETAL NON-STRESS TEST: CPT

## 2024-08-29 PROCEDURE — 87081 CULTURE SCREEN ONLY: CPT

## 2024-08-29 PROCEDURE — 99214 OFFICE O/P EST MOD 30 MIN: CPT

## 2024-08-29 PROCEDURE — 87491 CHLMYD TRACH DNA AMP PROBE: CPT

## 2024-08-29 PROCEDURE — 59025 FETAL NON-STRESS TEST: CPT | Mod: GC

## 2024-08-29 SDOH — SOCIAL STABILITY: SOCIAL INSECURITY: ARE THERE ANY APPARENT SIGNS OF INJURIES/BEHAVIORS THAT COULD BE RELATED TO ABUSE/NEGLECT?: NO

## 2024-08-29 SDOH — SOCIAL STABILITY: SOCIAL INSECURITY: DOES ANYONE TRY TO KEEP YOU FROM HAVING/CONTACTING OTHER FRIENDS OR DOING THINGS OUTSIDE YOUR HOME?: NO

## 2024-08-29 SDOH — ECONOMIC STABILITY: HOUSING INSECURITY: DO YOU FEEL UNSAFE GOING BACK TO THE PLACE WHERE YOU ARE LIVING?: NO

## 2024-08-29 SDOH — SOCIAL STABILITY: SOCIAL INSECURITY: ARE YOU OR HAVE YOU BEEN THREATENED OR ABUSED PHYSICALLY, EMOTIONALLY, OR SEXUALLY BY ANYONE?: NO

## 2024-08-29 SDOH — SOCIAL STABILITY: SOCIAL INSECURITY: PHYSICAL ABUSE: DENIES

## 2024-08-29 SDOH — HEALTH STABILITY: MENTAL HEALTH: HAVE YOU USED ANY SUBSTANCES (CANABIS, COCAINE, HEROIN, HALLUCINOGENS, INHALANTS, ETC.) IN THE PAST 12 MONTHS?: NO

## 2024-08-29 SDOH — SOCIAL STABILITY: SOCIAL INSECURITY: HAS ANYONE EVER THREATENED TO HURT YOUR FAMILY OR YOUR PETS?: NO

## 2024-08-29 SDOH — HEALTH STABILITY: MENTAL HEALTH: WERE YOU ABLE TO COMPLETE ALL THE BEHAVIORAL HEALTH SCREENINGS?: YES

## 2024-08-29 SDOH — HEALTH STABILITY: MENTAL HEALTH: WISH TO BE DEAD (PAST 1 MONTH): NO

## 2024-08-29 SDOH — HEALTH STABILITY: MENTAL HEALTH: NON-SPECIFIC ACTIVE SUICIDAL THOUGHTS (PAST 1 MONTH): NO

## 2024-08-29 SDOH — HEALTH STABILITY: MENTAL HEALTH: SUICIDAL BEHAVIOR (LIFETIME): NO

## 2024-08-29 SDOH — SOCIAL STABILITY: SOCIAL INSECURITY: DO YOU FEEL ANYONE HAS EXPLOITED OR TAKEN ADVANTAGE OF YOU FINANCIALLY OR OF YOUR PERSONAL PROPERTY?: NO

## 2024-08-29 SDOH — SOCIAL STABILITY: SOCIAL INSECURITY: ABUSE SCREEN: ADULT

## 2024-08-29 SDOH — SOCIAL STABILITY: SOCIAL INSECURITY: HAVE YOU HAD ANY THOUGHTS OF HARMING ANYONE ELSE?: NO

## 2024-08-29 SDOH — HEALTH STABILITY: MENTAL HEALTH: HAVE YOU USED ANY PRESCRIPTION DRUGS OTHER THAN PRESCRIBED IN THE PAST 12 MONTHS?: NO

## 2024-08-29 SDOH — SOCIAL STABILITY: SOCIAL INSECURITY: HAVE YOU HAD THOUGHTS OF HARMING ANYONE ELSE?: NO

## 2024-08-29 ASSESSMENT — LIFESTYLE VARIABLES
AUDIT-C TOTAL SCORE: 0
AUDIT-C TOTAL SCORE: 0
HOW OFTEN DO YOU HAVE 6 OR MORE DRINKS ON ONE OCCASION: NEVER
SKIP TO QUESTIONS 9-10: 1
HOW MANY STANDARD DRINKS CONTAINING ALCOHOL DO YOU HAVE ON A TYPICAL DAY: PATIENT DOES NOT DRINK
HOW OFTEN DO YOU HAVE A DRINK CONTAINING ALCOHOL: NEVER

## 2024-08-29 ASSESSMENT — PAIN SCALES - GENERAL
PAINLEVEL_OUTOF10: 0 - NO PAIN
PAINLEVEL_OUTOF10: 0 - NO PAIN

## 2024-08-29 ASSESSMENT — PATIENT HEALTH QUESTIONNAIRE - PHQ9
1. LITTLE INTEREST OR PLEASURE IN DOING THINGS: NOT AT ALL
2. FEELING DOWN, DEPRESSED OR HOPELESS: NOT AT ALL
SUM OF ALL RESPONSES TO PHQ9 QUESTIONS 1 & 2: 0

## 2024-08-30 ENCOUNTER — HOSPITAL ENCOUNTER (OUTPATIENT)
Facility: HOSPITAL | Age: 24
End: 2024-08-30
Attending: OBSTETRICS & GYNECOLOGY | Admitting: OBSTETRICS & GYNECOLOGY
Payer: COMMERCIAL

## 2024-08-30 LAB
BACTERIA UR CULT: NORMAL
C TRACH RRNA SPEC QL NAA+PROBE: NEGATIVE
N GONORRHOEA DNA SPEC QL PROBE+SIG AMP: NEGATIVE

## 2024-08-30 NOTE — H&P
Note Type:  OB Triage H&P    ASSESSMENT & PLAN: Keyla Lane is a 24 y.o.  at 28w3d who presents to triage with LOF    r/o ROM  - Pooling/nitrazine/ferning negative x3  - SVE: 0.5/30/-3  - CTX: intially q5-6 min; then spaced  - GC/CT, GBS, wet prep, urine culture collected and sent    Cramping  -contractions spaced after 20-30 min on monitor  -SVE as above  -tylenol and flexeril offered, patient declined. Improved with heat packs and PO hydration  -low concern for PTL    Maternal Well-Being  - No acute distress  - Vitals stable and WNL    Fetal Well-Being  - FHT initially with 2 subtle late decels, however, 1 hour of category 1 tracing after. Overall, mod variability and accels reassuring throughout  - Continue current prenatal care    Dispo  - Safe and stable for d/c to home  - Follow-up with OB provider as scheduled    Reviewed labor precautions with patient. Discussed need to return to labor and delivery if patient has strong, regular contractions, leakage of fluid, vaginal bleeding, or decreased fetal movement. Patient expresses understanding. Safe and stable for discharge at this time.    Discussed plan and reviewed with: Dr. Julian Juarez MD PGY-2      Pregnancy Problems (from 24 to present)       Problem Noted Resolved    Decreased fetal movement affecting management of mother, antepartum ([facility]) 8/15/2024 by Christine Kitchen MD No    Priority:  Medium      Decreased fetal movements in second trimester ([facility]) 2024 by CARLOTA Montejo-CNP No    Priority:  Medium      Iron deficiency anemia during pregnancy ([facility]) 2024 by Kristin Gil MD No    Priority:  Medium      Overview Addendum 2024  2:23 PM by CARLOTA Klein-LORENM     - Hgb 10.2 on 7/15  - Unable to tolerate oral iron, received IV Dextran x1 on   - Hgb dropped further to 9.4 on 8/15 while inpatient  - Per Dr. Melgar, repeat CBC / Retic in two weeks to allow more time to respond to  therapy         Sciatic nerve pain, left 7/25/2024 by Kristin Gil MD No    Priority:  Medium      Overview Signed 7/25/2024  4:02 PM by Kristin Gil MD     - Reviewed sciatic nerve pain in pregnancy  - Discussed stretches, declines chiropractor/PT  - Reviewed precautions for DVT given one sided pain - no erythema or edema on exam         Thrombocytosis 4/30/2024 by Kristin Gil MD No    Priority:  Medium      Overview Addendum 8/19/2024 12:46 PM by SAVANA Klein     Followed by hematologist Dr. Combs at [facility]; per pt, she was told that next steps for testing would be a bone marrow biopsy, and he recommends deferring this to postpartum period         Migraines 4/30/2024 by Kristin Gil MD No    Priority:  Medium      Overview Signed 4/30/2024  5:14 PM by Kristin Gil MD     - Follows with neurologist at [facility]         27 weeks gestation of pregnancy ([facility]) 4/30/2024 by Kristin Gil MD No    Priority:  Medium      Overview Addendum 8/19/2024 12:21 PM by SAVANA Klein     [x] Initial BMI: 20  [x] Dating US: 8w  [x] Prenatal Labs: NEON 3/25  [x] Pap: NEON 4/11  [x] Genetic Screening: cffDNA ordered   [x] bASA: Not needed  [x] Anatomy US: 6/26  [x] 1hr GCT at 24-28wks:  [x] Tdap (27-36wks): 8/19/24  [] Flu/COVID:   [x] Rhogam (if Rh neg): N/A  [] GBS at 36 wks:  [] Breastfeeding:   [] Postpartum Birth control method:   [] 39 weeks discussion of IOL vs. Expectant management:  [] Mode of delivery:           Ovarian cyst affecting pregnancy in first trimester, antepartum ([facility]) 4/30/2024 by Kristin Gil MD 7/25/2024 by Kristin Gil MD    Overview Signed 4/30/2024  5:17 PM by Kristin Gil MD     - Torsion precautions reviewed  - Will reexamine at NT scheduled 5/17                 Subjective   Patient reporting gush of fluid around 1900. States fluid leaked through her shorts. Has not had leaking since then. No dysuria. No recent  intercourse. No abdominal trauma.     Good fetal movement.  Denies vaginal bleeding., Denies contractions.      OB History    Para Term  AB Living   2 1 1 0 0 1   SAB IAB Ectopic Multiple Live Births   0 0 0 0 1      # Outcome Date GA Lbr Pako/2nd Weight Sex Type Anes PTL Lv   2 Current            1 Term 19    M    LAUREANO       History reviewed. No pertinent surgical history.    Social History     Tobacco Use    Smoking status: Never    Smokeless tobacco: Never   Substance Use Topics    Alcohol use: Not Currently     Comment: Occasionally- Birthdays/Celebrations       Allergies   Allergen Reactions    Bee Venom Protein (Honey Bee) Anaphylaxis     Unsure what type of bee       Medications Prior to Admission   Medication Sig Dispense Refill Last Dose    cyproheptadine (Periactin) 4 mg tablet TAKE 1 TABLET (4 MG) BY MOUTH 2 TIMES A DAY AS NEEDED (HEADACHE). 180 tablet 1 2024    eletriptan (Relpax) 40 mg tablet Take 0.5 tablets (20 mg) by mouth 1 time if needed for headaches.   Unknown    ferrous sulfate, dried 160 mg (50 mg iron) ER tablet Take 1 tablet (47.5 mg) by mouth once daily.   Unknown    magnesium oxide 400 mg magnesium capsule TAKE 1 CAPSULE BY MOUTH ONCE DAILY.**NOT COVERED 90 capsule 1 Unknown    Se- 19 Chewable 29 mg iron- 1 mg tablet,chewable Chew 1 tablet once daily. 30 tablet 3      Objective     Last Vitals  Temp Pulse Resp BP MAP O2 Sat   36.8 °C (98.2 °F) (!) 111 16 121/70 90 98 %     Blood Pressures         2024  2143             BP: 121/70             Physical Exam  General: NAD, mood appropriate  Cardiopulmonary: warm and well perfused, breathing comfortably on room air  Abdomen: Gravid, non-tender  Extremities: full range of motion  Speculum Exam: no pooling of fluid seen, Nitrizine test is negative, Ferning test is negative, scant thin white vaginal discharge Cervix: Fingertip /30 /-3      Fetal Monitoring  Baseline: 135 bpm, Variability: moderate,  Accelerations:  present and Decelerations: 2 subtle late decels in first 20 min, then no decelerations for 1 hour    Uterine Activity: contractions q5-6 min, then irritable for 1 hour    Interpretation: AGA        Labs in chart were reviewed.          Prenatal labs reviewed, not remarkable.

## 2024-09-01 LAB — GP B STREP GENITAL QL CULT: NORMAL

## 2024-09-06 ENCOUNTER — ROUTINE PRENATAL (OUTPATIENT)
Dept: OBSTETRICS AND GYNECOLOGY | Facility: HOSPITAL | Age: 24
End: 2024-09-06
Payer: COMMERCIAL

## 2024-09-06 ENCOUNTER — LAB (OUTPATIENT)
Dept: LAB | Facility: LAB | Age: 24
End: 2024-09-06
Payer: COMMERCIAL

## 2024-09-06 VITALS — SYSTOLIC BLOOD PRESSURE: 107 MMHG | WEIGHT: 150.4 LBS | DIASTOLIC BLOOD PRESSURE: 71 MMHG | BODY MASS INDEX: 26.65 KG/M2

## 2024-09-06 DIAGNOSIS — D50.9 IRON DEFICIENCY ANEMIA DURING PREGNANCY (HHS-HCC): Primary | ICD-10-CM

## 2024-09-06 DIAGNOSIS — O99.019 IRON DEFICIENCY ANEMIA DURING PREGNANCY (HHS-HCC): Primary | ICD-10-CM

## 2024-09-06 DIAGNOSIS — D50.9 IRON DEFICIENCY ANEMIA DURING PREGNANCY (HHS-HCC): ICD-10-CM

## 2024-09-06 DIAGNOSIS — O99.019 IRON DEFICIENCY ANEMIA DURING PREGNANCY (HHS-HCC): ICD-10-CM

## 2024-09-06 DIAGNOSIS — Z34.83 PRENATAL CARE, SUBSEQUENT PREGNANCY, THIRD TRIMESTER (HHS-HCC): ICD-10-CM

## 2024-09-06 LAB
APTT PPP: 30 SECONDS (ref 27–38)
ERYTHROCYTE [DISTWIDTH] IN BLOOD BY AUTOMATED COUNT: 16.1 % (ref 11.5–14.5)
ERYTHROCYTE [DISTWIDTH] IN BLOOD BY AUTOMATED COUNT: 16.2 % (ref 11.5–14.5)
FERRITIN SERPL-MCNC: 172 NG/ML
FIBRINOGEN PPP-MCNC: 318 MG/DL (ref 200–400)
HCT VFR BLD AUTO: 33 % (ref 36–46)
HCT VFR BLD AUTO: 33.4 % (ref 36–46)
HGB BLD-MCNC: 10.6 G/DL (ref 12–16)
HGB BLD-MCNC: 10.7 G/DL (ref 12–16)
INR PPP: 1 (ref 0.9–1.1)
IRON SATN MFR SERPL: 12 %
IRON SERPL-MCNC: 51 UG/DL
MCH RBC QN AUTO: 24.9 PG (ref 26–34)
MCH RBC QN AUTO: 24.9 PG (ref 26–34)
MCHC RBC AUTO-ENTMCNC: 32 G/DL (ref 32–36)
MCHC RBC AUTO-ENTMCNC: 32.1 G/DL (ref 32–36)
MCV RBC AUTO: 78 FL (ref 80–100)
MCV RBC AUTO: 78 FL (ref 80–100)
NRBC BLD-RTO: 0 /100 WBCS (ref 0–0)
NRBC BLD-RTO: 0 /100 WBCS (ref 0–0)
PLATELET # BLD AUTO: 311 X10*3/UL (ref 150–450)
PLATELET # BLD AUTO: 322 X10*3/UL (ref 150–450)
PROTHROMBIN TIME: 11 SECONDS (ref 9.8–12.8)
RBC # BLD AUTO: 4.25 X10*6/UL (ref 4–5.2)
RBC # BLD AUTO: 4.3 X10*6/UL (ref 4–5.2)
REFLEX ADDED, ANEMIA PANEL: NORMAL
TIBC SERPL-MCNC: 439 UG/DL
UIBC SERPL-MCNC: 388 UG/DL
WBC # BLD AUTO: 8.7 X10*3/UL (ref 4.4–11.3)
WBC # BLD AUTO: 8.8 X10*3/UL (ref 4.4–11.3)

## 2024-09-06 PROCEDURE — 85610 PROTHROMBIN TIME: CPT

## 2024-09-06 PROCEDURE — 85384 FIBRINOGEN ACTIVITY: CPT

## 2024-09-06 PROCEDURE — 85730 THROMBOPLASTIN TIME PARTIAL: CPT

## 2024-09-06 PROCEDURE — 85027 COMPLETE CBC AUTOMATED: CPT

## 2024-09-06 PROCEDURE — 83550 IRON BINDING TEST: CPT

## 2024-09-06 PROCEDURE — 36415 COLL VENOUS BLD VENIPUNCTURE: CPT

## 2024-09-06 PROCEDURE — 82728 ASSAY OF FERRITIN: CPT

## 2024-09-06 NOTE — PROGRESS NOTES
"Reynold Lane \"Cydney\" is a 24 y.o.  at 29w4d with a working estimated date of delivery of 2024, by Ultrasound who presents for a routine prenatal visit.     She denies vaginal bleeding, leakage of fluid, decreased fetal movements, or contractions.  Patient reports having a hospital visit on  after feeling a loss of fluid.  With evaluation she was diagnosed with intact bag of water and probable loss of urine.  Discharged to home.    Patient is questioning whether she is getting another iron infusion as there is some confusion as to whether she was to get another CBC or another infusion.  Last note reports patient is to have CBC repeated and patient encouraged to go and do that today and further iron infusion order would be made from those results.  No other concerns expressed.  Objective   Physical Exam:   Weight: 68.2 kg (150 lb 6.4 oz)  TWG: 15.6 kg (34 lb 6.4 oz)  BP: 107/71  Fetal Heart Rate: 154 Fundal Height (cm): 29 cm           Postpartum Depression: Low Risk  (2024)    [place]  Depression Scale     Last EPDS Total Score: 0     Last EPDS Self Harm Result: Never        Prenatal Labs  Lab Results   Component Value Date    HGB 9.4 (L) 08/15/2024    HCT 29.0 (L) 08/15/2024     08/15/2024    SYPHT Nonreactive 2024     Lab Results   Component Value Date    GLUC1P 123 2024     Group B Strep Screen   Date Value Ref Range Status   2024 No Group B Streptococcus (GBS) isolated  Final        Imaging  The most recent ultrasound was performed on 8/15     Assessment/Plan   Problem List Items Addressed This Visit       Iron deficiency anemia during pregnancy ([facility]) - Primary    Overview     - Hgb 10.2 on 7/15  - Unable to tolerate oral iron, received IV Dextran x1 on   - Hgb dropped further to 9.4 on 8/15 while inpatient  - Per Dr. Melgar, repeat CBC / Retic in two weeks to allow more time to respond to therapy         Relevant Orders    CBC " Anemia Panel With Reflex,Pregnancy       Reviewed s/sx of PTL, warning signs, fetal movement counts, and when to call provider  Follow up in 2 week for a routine prenatal visit.    Keiry Carranza, CARLOTA-CNP

## 2024-09-16 ENCOUNTER — APPOINTMENT (OUTPATIENT)
Dept: CARDIOLOGY | Facility: HOSPITAL | Age: 24
End: 2024-09-16
Payer: COMMERCIAL

## 2024-09-19 ENCOUNTER — APPOINTMENT (OUTPATIENT)
Dept: OBSTETRICS AND GYNECOLOGY | Facility: HOSPITAL | Age: 24
End: 2024-09-19
Payer: COMMERCIAL

## 2024-09-26 RX ORDER — IBUPROFEN 600 MG/1
TABLET ORAL EVERY 6 HOURS
COMMUNITY
Start: 2019-12-01

## 2024-09-26 RX ORDER — DOCUSATE SODIUM 100 MG/1
CAPSULE, LIQUID FILLED ORAL
COMMUNITY
Start: 2019-12-01 | End: 2024-12-31

## 2024-09-26 RX ORDER — NARATRIPTAN 2.5 MG/1
TABLET ORAL
COMMUNITY
Start: 2023-11-22

## 2024-09-26 RX ORDER — RIZATRIPTAN BENZOATE 10 MG/1
TABLET ORAL
COMMUNITY
Start: 2023-10-19

## 2024-09-26 RX ORDER — ALMOTRIPTAN 12.5 MG/1
TABLET, FILM COATED ORAL
COMMUNITY
Start: 2023-12-07

## 2024-09-30 ENCOUNTER — ROUTINE PRENATAL (OUTPATIENT)
Dept: OBSTETRICS AND GYNECOLOGY | Facility: HOSPITAL | Age: 24
End: 2024-09-30
Payer: COMMERCIAL

## 2024-09-30 ENCOUNTER — OFFICE VISIT (OUTPATIENT)
Dept: CARDIOLOGY | Facility: HOSPITAL | Age: 24
End: 2024-09-30
Payer: COMMERCIAL

## 2024-09-30 VITALS — BODY MASS INDEX: 27.46 KG/M2 | SYSTOLIC BLOOD PRESSURE: 118 MMHG | WEIGHT: 155 LBS | DIASTOLIC BLOOD PRESSURE: 81 MMHG

## 2024-09-30 VITALS
HEART RATE: 122 BPM | HEIGHT: 63 IN | SYSTOLIC BLOOD PRESSURE: 118 MMHG | DIASTOLIC BLOOD PRESSURE: 78 MMHG | BODY MASS INDEX: 27.57 KG/M2 | OXYGEN SATURATION: 97 % | WEIGHT: 155.6 LBS

## 2024-09-30 DIAGNOSIS — R00.2 PALPITATIONS: ICD-10-CM

## 2024-09-30 DIAGNOSIS — R06.02 SHORTNESS OF BREATH: ICD-10-CM

## 2024-09-30 DIAGNOSIS — G43.E09 CHRONIC MIGRAINE WITH AURA WITHOUT STATUS MIGRAINOSUS, NOT INTRACTABLE: ICD-10-CM

## 2024-09-30 DIAGNOSIS — Z3A.33 33 WEEKS GESTATION OF PREGNANCY (HHS-HCC): Primary | ICD-10-CM

## 2024-09-30 DIAGNOSIS — R00.0 TACHYCARDIA: Primary | ICD-10-CM

## 2024-09-30 DIAGNOSIS — Z34.83 PRENATAL CARE, SUBSEQUENT PREGNANCY IN THIRD TRIMESTER (HHS-HCC): ICD-10-CM

## 2024-09-30 PROCEDURE — 3008F BODY MASS INDEX DOCD: CPT | Performed by: STUDENT IN AN ORGANIZED HEALTH CARE EDUCATION/TRAINING PROGRAM

## 2024-09-30 PROCEDURE — 1036F TOBACCO NON-USER: CPT | Performed by: STUDENT IN AN ORGANIZED HEALTH CARE EDUCATION/TRAINING PROGRAM

## 2024-09-30 PROCEDURE — 90656 IIV3 VACC NO PRSV 0.5 ML IM: CPT

## 2024-09-30 PROCEDURE — 0501F PRENATAL FLOW SHEET: CPT

## 2024-09-30 PROCEDURE — 93005 ELECTROCARDIOGRAM TRACING: CPT | Performed by: STUDENT IN AN ORGANIZED HEALTH CARE EDUCATION/TRAINING PROGRAM

## 2024-09-30 PROCEDURE — 99214 OFFICE O/P EST MOD 30 MIN: CPT | Performed by: STUDENT IN AN ORGANIZED HEALTH CARE EDUCATION/TRAINING PROGRAM

## 2024-09-30 PROCEDURE — 99204 OFFICE O/P NEW MOD 45 MIN: CPT | Performed by: STUDENT IN AN ORGANIZED HEALTH CARE EDUCATION/TRAINING PROGRAM

## 2024-09-30 ASSESSMENT — ENCOUNTER SYMPTOMS
CHILLS: 0
DEPRESSION: 0
DYSPNEA ON EXERTION: 1
PALPITATIONS: 1
PND: 1
CLAUDICATION: 0
OCCASIONAL FEELINGS OF UNSTEADINESS: 0
LOSS OF SENSATION IN FEET: 0
DECREASED APPETITE: 0
SHORTNESS OF BREATH: 0
HALLUCINATIONS: 0
SYNCOPE: 0
ALTERED MENTAL STATUS: 0

## 2024-09-30 ASSESSMENT — PAIN SCALES - GENERAL: PAINLEVEL: 0-NO PAIN

## 2024-09-30 ASSESSMENT — PATIENT HEALTH QUESTIONNAIRE - PHQ9
2. FEELING DOWN, DEPRESSED OR HOPELESS: NOT AT ALL
SUM OF ALL RESPONSES TO PHQ9 QUESTIONS 1 AND 2: 0
1. LITTLE INTEREST OR PLEASURE IN DOING THINGS: NOT AT ALL

## 2024-09-30 NOTE — PROGRESS NOTES
"                        [facility] - General Cardiology Note                                                                                        Reason for Visit: New Patient Visit and Palpitations (/).  Referring Clinician: [place]     History of Present Illness  Keyla Lane \"Cydney\" is a 24 y.o. female with history of migraines without aura who is currently 33 weeks pregnant via IVF who presents for New Patient Visit and Palpitations (/).    List of Active Cardiac Issues:  - Palpitations: Patient reports that she went to the emergency room at the end of August for concerns of decreased fetal movement.  At the time she was having palpitations and her heart rate was in the 150s while in the hospital.  She was given a Holter monitor and appointment with cardiology.  She reports that her palpitations are unpredictable but usually happen with minimal activity.  Her Holter monitor showed normal sinus rhythm with periods of sinus tachycardia up to 156 bpm.  No evidence of significant cardiac arrhythmia.  Patient reports that she is easily winded with minimal activity.  She cannot lay on her back.  She denies any lower extremity swelling.  She reports that sometimes she wakes up from sleep with shortness of breath.  No personal history of any heart disease.  No history of gestational hypertension or preeclampsia.    Past Medical History  She has a past medical history of Migraine (10/23).    Past Surgical History  She has no past surgical history on file.    Social History  She reports that she has never smoked. She has never used smokeless tobacco. She reports that she does not currently use alcohol. She reports that she does not currently use drugs after having used the following drugs: Marijuana.    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    Family History  Family History   Problem Relation Name Age of Onset    Cancer Paternal Grandmother Ny daley  " "      Allergies  Bee venom protein (honey bee)    Outpatient Medications  Current Outpatient Medications   Medication Instructions    almotriptan (Axert) 12.5 mg tablet take 1 tablet by mouth AT ONSET OF MIGRAINE May repeat one time a...  (REFER TO PRESCRIPTION NOTES).    cyproheptadine (PERIACTIN) 4 mg, oral, 2 times daily PRN    docusate sodium (Colace) 100 mg capsule oral, Daily RT    eletriptan (RELPAX) 20 mg, oral, Once as needed    ferrous sulfate, dried 160 mg (50 mg iron) ER tablet 1 tablet, oral, Daily    ibuprofen 600 mg tablet oral, Every 6 hours    magnesium oxide 400 mg magnesium capsule TAKE 1 CAPSULE BY MOUTH ONCE DAILY.**NOT COVERED    naratriptan (Amerge) 2.5 mg tablet take 1 tablet by mouth if needed AT ONSET OF HEADACHE may repeat ...  (REFER TO PRESCRIPTION NOTES).    rizatriptan (Maxalt) 10 mg tablet take 1 tablet by mouth AT ONSET OF HEADACHE may repeat in 2 hours...  (REFER TO PRESCRIPTION NOTES).    Se- 19 Chewable 29 mg iron- 1 mg tablet,chewable 1 tablet, oral, Daily RT       Review of Systems  Review of Systems   Constitutional: Positive for malaise/fatigue. Negative for chills and decreased appetite.   Cardiovascular:  Positive for dyspnea on exertion, palpitations and paroxysmal nocturnal dyspnea. Negative for chest pain, claudication, leg swelling and syncope.   Respiratory:  Negative for shortness of breath.    Musculoskeletal:  Negative for arthritis.   Psychiatric/Behavioral:  Negative for altered mental status and hallucinations.    All other systems reviewed and are negative.      Last Recorded Vitals  Vitals:    24 0822   BP: 118/78   BP Location: Left arm   Patient Position: Sitting   BP Cuff Size: Large adult   Pulse: (!) 122   SpO2: 97%   Weight: 70.6 kg (155 lb 9.6 oz)   Height: 1.6 m (5' 3\")       Physical Examination  General: Well appearing, well-nourished, in no acute distress.  HEENT: Normocephalic atraumatic, pupils equal and reactive to light, extraocular " "muscles intact, no conjunctival injection, oropharynx clear without exudates.  Neck: Normal carotid arterial pulses, no arterial bruits, no thyromegaly.  Cardiac: Regular rhythm and normal heart rate.  S1, S2 present and normal.  No murmurs, rubs, or gallops.  PMI is nondisplaced. Jugular venous pulsations are normal.  Pulmonary: Normal breath sounds, no increased work of breathing, no wheezes or crackles.  GI: Normal bowel sounds, abdominal aorta not enlarged, no hepatosplenomegaly, no abdominal bruits.  Lower extremities: No cyanosis, clubbing, or edema.  No xanthelasma present. Normal distal pulses.  Skin: Skin intact. No significant rashes or lesions present.  Neuro: Alert and oriented x 3, normal attention and cognition, no focal motor or sensory neurologic deficits.  Psych: Normal affect and mood.  Musculoskeletal: Normal gait normal muscle tone.    Laboratory Studies  Lab Results   Component Value Date    GLUCOSE 74 08/14/2024    CALCIUM 9.4 08/14/2024     08/14/2024    K 4.6 08/14/2024    CO2 25 08/14/2024     08/14/2024    BUN 6 08/14/2024    CREATININE 0.49 (L) 08/14/2024     Lab Results   Component Value Date    ALT 12 08/14/2024    AST 18 08/14/2024    ALKPHOS 97 08/14/2024    BILITOT 0.2 08/14/2024         Lab Results   Component Value Date    CHOL 133 01/12/2022     Lab Results   Component Value Date    HDL 50.1 01/12/2022     No results found for: \"LDLCALC\"  Lab Results   Component Value Date    TRIG 69 01/12/2022     No components found for: \"CHOLHDL\"  Lab Results   Component Value Date    HGBA1C 5.8 (A) 01/12/2022     No components found for: \"UACR\"    Cardiology Tests  ECG:   EKG today showed sinus tachycardia up to 116 bpm.  Possible left atrial enlargement.  I personally reviewed EKG and Holter monitor results with the patient.      Assessment and Plan  Mrs. Lane is a 24-year-old lady with history of migraines without aura and 33 weeks of pregnancy, who presents today for " follow-up for palpitations.  We reassured the patient in regards to her palpitations.  Her Holter monitor showed largely sinus rhythm without any evidence of arrhythmias.  Given her shortness of breath on exertion and PND, we will order a TTE.  We will follow-up with the patient over the phone for her TTE.     Problem List Items Addressed This Visit          Cardiac and Vasculature    Tachycardia - Primary    Relevant Orders    ECG 12 Lead    Transthoracic Echo (TTE) Complete     Other Visit Diagnoses       Palpitations        Relevant Orders    Transthoracic Echo (TTE) Complete    Shortness of breath        Relevant Orders    Transthoracic Echo (TTE) Complete                  Rhianna Harvey MD

## 2024-09-30 NOTE — PATIENT INSTRUCTIONS
Dear  Domingo,  You were seen today in the [facility] for concerns of palpitations.  Your Holter monitor from August showed normal rhythm.  No signs of arrhythmias.  Given your shortness of breath, we ordered an echocardiogram.  Your examination was reassuring today.  Will follow-up on the results of the echocardiogram and give you a call when the results are available.

## 2024-10-01 ENCOUNTER — DOCUMENTATION (OUTPATIENT)
Dept: OBSTETRICS AND GYNECOLOGY | Facility: HOSPITAL | Age: 24
End: 2024-10-01
Payer: COMMERCIAL

## 2024-10-02 LAB
ATRIAL RATE: 116 BPM
P AXIS: 29 DEGREES
P OFFSET: 202 MS
P ONSET: 150 MS
PR INTERVAL: 150 MS
Q ONSET: 225 MS
QRS COUNT: 19 BEATS
QRS DURATION: 72 MS
QT INTERVAL: 312 MS
QTC CALCULATION(BAZETT): 433 MS
QTC FREDERICIA: 388 MS
R AXIS: 16 DEGREES
T AXIS: 13 DEGREES
T OFFSET: 381 MS
VENTRICULAR RATE: 116 BPM

## 2024-10-11 PROBLEM — O36.8190 DECREASED FETAL MOVEMENT AFFECTING MANAGEMENT OF MOTHER, ANTEPARTUM (HHS-HCC): Status: RESOLVED | Noted: 2024-08-15 | Resolved: 2024-10-11

## 2024-10-11 PROBLEM — O36.8120 DECREASED FETAL MOVEMENTS IN SECOND TRIMESTER (HHS-HCC): Status: RESOLVED | Noted: 2024-08-14 | Resolved: 2024-10-11

## 2024-10-15 ENCOUNTER — ROUTINE PRENATAL (OUTPATIENT)
Dept: OBSTETRICS AND GYNECOLOGY | Facility: HOSPITAL | Age: 24
End: 2024-10-15
Payer: COMMERCIAL

## 2024-10-15 ENCOUNTER — PATIENT MESSAGE (OUTPATIENT)
Dept: OBSTETRICS AND GYNECOLOGY | Facility: HOSPITAL | Age: 24
End: 2024-10-15

## 2024-10-15 ENCOUNTER — DOCUMENTATION (OUTPATIENT)
Dept: OBSTETRICS AND GYNECOLOGY | Facility: HOSPITAL | Age: 24
End: 2024-10-15
Payer: COMMERCIAL

## 2024-10-15 VITALS — WEIGHT: 163 LBS | DIASTOLIC BLOOD PRESSURE: 70 MMHG | SYSTOLIC BLOOD PRESSURE: 107 MMHG | BODY MASS INDEX: 28.87 KG/M2

## 2024-10-15 DIAGNOSIS — G43.E09 CHRONIC MIGRAINE WITH AURA WITHOUT STATUS MIGRAINOSUS, NOT INTRACTABLE: ICD-10-CM

## 2024-10-15 DIAGNOSIS — Z34.83 PRENATAL CARE, SUBSEQUENT PREGNANCY, THIRD TRIMESTER (HHS-HCC): Primary | ICD-10-CM

## 2024-10-15 DIAGNOSIS — Z3A.35 35 WEEKS GESTATION OF PREGNANCY (HHS-HCC): ICD-10-CM

## 2024-10-15 DIAGNOSIS — Z71.85 VACCINE COUNSELING: ICD-10-CM

## 2024-10-15 PROCEDURE — 0501F PRENATAL FLOW SHEET: CPT | Performed by: ADVANCED PRACTICE MIDWIFE

## 2024-10-15 PROCEDURE — 90678 RSV VACC PREF BIVALENT IM: CPT | Performed by: ADVANCED PRACTICE MIDWIFE

## 2024-10-15 NOTE — PROGRESS NOTES
"Reynold Lane \"Cydney\" is a 24 y.o.  at 35w1d with a working estimated date of delivery of 2024, by Ultrasound who presents for a routine prenatal visit.     She denies vaginal bleeding, leakage of fluid, decreased fetal movements, or contractions.    Objective   Physical Exam:   Weight: 73.9 kg (163 lb)  TW.3 kg (47 lb)  BP: 107/70  Fetal Heart Rate: 155 Fundal Height (cm): 36 cm Presentation: Breech (by Leopold's)         Postpartum Depression: Low Risk  (2024)    [place]  Depression Scale     Last EPDS Total Score: 0     Last EPDS Self Harm Result: Never      Prenatal Labs  Lab Results   Component Value Date    HGB 10.6 (L) 2024    HGB 10.7 (L) 2024    HCT 33.0 (L) 2024    HCT 33.4 (L) 2024     2024     2024    SYPHT Nonreactive 2024     Lab Results   Component Value Date    GLUC1P 123 2024     Group B Strep Screen   Date Value Ref Range Status   2024 No Group B Streptococcus (GBS) isolated  Final     Assessment/Plan   Problem List Items Addressed This Visit          Pregnancy    35 weeks gestation of pregnancy ([facility])    Overview     [x] Initial BMI: 20  [x] Dating US: 8w  [x] Prenatal Labs: NEON 3/25  [x] Pap: NEON   [x] Genetic Screening: cffDNA ordered   [x] bASA: Not needed  [x] Anatomy US:   [x] 1hr GCT at 24-28wks:  [x] Tdap (27-36wks): 24  [x] Flu:   [x] Rhogam (if Rh neg): N/A  [] GBS at 36 wks:  [x] Breastfeeding: planning to pump and bottle feed  [] Postpartum Birth control method:   [] 39 weeks discussion of IOL vs. Expectant management:  [] Mode of delivery:           Relevant Orders    Respiratory Syncytial Virus (RSV), Eligible Pregnant Pts, 0.5 mL (ABRYSVO) (Completed)    Migraines    Overview     - Follows with neurologist at [facility]  35w1d saw neuro , increased periactin to 4mg bid-tid, feeling well          Other Visit Diagnoses       Prenatal care, subsequent " pregnancy, third trimester ([facility])    -  Primary    Relevant Orders    Respiratory Syncytial Virus (RSV), Eligible Pregnant Pts, 0.5 mL (ABRYSVO) (Completed)    Vaccine counseling        Relevant Orders    Respiratory Syncytial Virus (RSV), Eligible Pregnant Pts, 0.5 mL (ABRYSVO) (Completed)          Reviewed lab results, plan to continue iron   Discussed routine GBS screening, to be completed next visit  Reviewed s/sx of PTL, warning signs, fetal movement counts, and when to call provider  RSV vaccine today  Breech? On leopold's today, recheck position at next OV  GS from 8/2024 recommended follow up US in 4 weeks, never completed - pt aware to schedule US  Follow up in 1 week for a routine prenatal visit.    Anamaria Puri, APRN-CNM, APRN-CNP

## 2024-10-16 DIAGNOSIS — O36.8121 DECREASED FETAL MOVEMENTS IN SECOND TRIMESTER, FETUS 1 OF MULTIPLE GESTATION (HHS-HCC): ICD-10-CM

## 2024-10-16 DIAGNOSIS — Z34.83 PRENATAL CARE, SUBSEQUENT PREGNANCY, THIRD TRIMESTER (HHS-HCC): Primary | ICD-10-CM

## 2024-10-22 ENCOUNTER — HOSPITAL ENCOUNTER (OUTPATIENT)
Dept: RADIOLOGY | Facility: HOSPITAL | Age: 24
Discharge: HOME | End: 2024-10-22
Payer: COMMERCIAL

## 2024-10-22 ENCOUNTER — LAB (OUTPATIENT)
Dept: LAB | Facility: LAB | Age: 24
End: 2024-10-22
Payer: COMMERCIAL

## 2024-10-22 ENCOUNTER — ROUTINE PRENATAL (OUTPATIENT)
Dept: OBSTETRICS AND GYNECOLOGY | Facility: HOSPITAL | Age: 24
End: 2024-10-22
Payer: COMMERCIAL

## 2024-10-22 ENCOUNTER — HOSPITAL ENCOUNTER (OUTPATIENT)
Dept: CARDIOLOGY | Facility: HOSPITAL | Age: 24
Discharge: HOME | End: 2024-10-22
Payer: COMMERCIAL

## 2024-10-22 VITALS — DIASTOLIC BLOOD PRESSURE: 78 MMHG | BODY MASS INDEX: 28.7 KG/M2 | SYSTOLIC BLOOD PRESSURE: 116 MMHG | WEIGHT: 162 LBS

## 2024-10-22 DIAGNOSIS — O99.019 IRON DEFICIENCY ANEMIA DURING PREGNANCY (HHS-HCC): ICD-10-CM

## 2024-10-22 DIAGNOSIS — R06.02 SHORTNESS OF BREATH: ICD-10-CM

## 2024-10-22 DIAGNOSIS — R00.0 TACHYCARDIA: ICD-10-CM

## 2024-10-22 DIAGNOSIS — R00.2 HEART PALPITATIONS: ICD-10-CM

## 2024-10-22 DIAGNOSIS — D50.9 IRON DEFICIENCY ANEMIA DURING PREGNANCY (HHS-HCC): ICD-10-CM

## 2024-10-22 DIAGNOSIS — R00.2 PALPITATIONS: ICD-10-CM

## 2024-10-22 DIAGNOSIS — Z34.83 PRENATAL CARE, SUBSEQUENT PREGNANCY, THIRD TRIMESTER (HHS-HCC): ICD-10-CM

## 2024-10-22 DIAGNOSIS — Z3A.36 36 WEEKS GESTATION OF PREGNANCY (HHS-HCC): ICD-10-CM

## 2024-10-22 DIAGNOSIS — Z34.83 ENCOUNTER FOR SUPERVISION OF OTHER NORMAL PREGNANCY IN THIRD TRIMESTER: Primary | ICD-10-CM

## 2024-10-22 DIAGNOSIS — O36.8121 DECREASED FETAL MOVEMENTS IN SECOND TRIMESTER, FETUS 1 OF MULTIPLE GESTATION (HHS-HCC): ICD-10-CM

## 2024-10-22 LAB
AORTIC VALVE PEAK VELOCITY: 1.19 M/S
AV PEAK GRADIENT: 5.7 MMHG
AVA (PEAK VEL): 2.75 CM2
EJECTION FRACTION APICAL 4 CHAMBER: 73
EJECTION FRACTION: 63 %
ERYTHROCYTE [DISTWIDTH] IN BLOOD BY AUTOMATED COUNT: 16.2 % (ref 11.5–14.5)
HCT VFR BLD AUTO: 35.1 % (ref 36–46)
HGB BLD-MCNC: 11.3 G/DL (ref 12–16)
HGB RETIC QN: 27 PG (ref 28–38)
IMMATURE RETIC FRACTION: 25 %
LEFT ATRIUM VOLUME AREA LENGTH INDEX BSA: 17.2 ML/M2
LEFT VENTRICLE INTERNAL DIMENSION DIASTOLE: 3.5 CM (ref 3.5–6)
LEFT VENTRICULAR OUTFLOW TRACT DIAMETER: 2 CM
MCH RBC QN AUTO: 24.7 PG (ref 26–34)
MCHC RBC AUTO-ENTMCNC: 32.2 G/DL (ref 32–36)
MCV RBC AUTO: 77 FL (ref 80–100)
NRBC BLD-RTO: 0 /100 WBCS (ref 0–0)
PLATELET # BLD AUTO: 324 X10*3/UL (ref 150–450)
RBC # BLD AUTO: 4.58 X10*6/UL (ref 4–5.2)
REFLEX ADDED, ANEMIA PANEL: NORMAL
RETICS #: 0.09 X10*6/UL (ref 0.02–0.08)
RETICS/RBC NFR AUTO: 1.9 % (ref 0.5–2)
RIGHT VENTRICLE FREE WALL PEAK S': 11.5 CM/S
TRICUSPID ANNULAR PLANE SYSTOLIC EXCURSION: 1.2 CM
WBC # BLD AUTO: 8.8 X10*3/UL (ref 4.4–11.3)

## 2024-10-22 PROCEDURE — 76816 OB US FOLLOW-UP PER FETUS: CPT | Performed by: STUDENT IN AN ORGANIZED HEALTH CARE EDUCATION/TRAINING PROGRAM

## 2024-10-22 PROCEDURE — 87081 CULTURE SCREEN ONLY: CPT | Performed by: ADVANCED PRACTICE MIDWIFE

## 2024-10-22 PROCEDURE — 93306 TTE W/DOPPLER COMPLETE: CPT

## 2024-10-22 PROCEDURE — 76819 FETAL BIOPHYS PROFIL W/O NST: CPT

## 2024-10-22 PROCEDURE — 76819 FETAL BIOPHYS PROFIL W/O NST: CPT | Performed by: STUDENT IN AN ORGANIZED HEALTH CARE EDUCATION/TRAINING PROGRAM

## 2024-10-22 PROCEDURE — 0501F PRENATAL FLOW SHEET: CPT | Performed by: ADVANCED PRACTICE MIDWIFE

## 2024-10-22 PROCEDURE — 93306 TTE W/DOPPLER COMPLETE: CPT | Performed by: INTERNAL MEDICINE

## 2024-10-22 PROCEDURE — 76816 OB US FOLLOW-UP PER FETUS: CPT

## 2024-10-22 NOTE — PROGRESS NOTES
"Reynold Lane \"Cydney\" is a 24 y.o.  at 36w1d with a working estimated date of delivery of 2024, by Ultrasound who presents for a routine prenatal visit.     She denies vaginal bleeding, leakage of fluid, decreased fetal movements, or contractions. Started Novaferrum liquid iron    Objective   Physical Exam:   Weight: 73.5 kg (162 lb)  TW.9 kg (46 lb)  BP: 116/78  Fetal Heart Rate: 145 Fundal Height (cm): 38 cm Presentation: Vertex (on US today)         Postpartum Depression: Low Risk  (2024)    [place]  Depression Scale     Last EPDS Total Score: 0     Last EPDS Self Harm Result: Never        Prenatal Labs  Lab Results   Component Value Date    HGB 10.6 (L) 2024    HGB 10.7 (L) 2024    HCT 33.0 (L) 2024    HCT 33.4 (L) 2024     2024     2024    SYPHT Nonreactive 2024     Lab Results   Component Value Date    GLUC1P 123 2024     Group B Strep Screen   Date Value Ref Range Status   2024 No Group B Streptococcus (GBS) isolated  Final      Imaging  The most recent ultrasound was performed today, results are not yet available.    Assessment/Plan   Problem List Items Addressed This Visit          Pregnancy    36 weeks gestation of pregnancy ([facility])    Overview     [x] Initial BMI: 20  [x] Dating US: 8w  [x] Prenatal Labs: NEON 3/25  [x] Pap: NEON   [x] Genetic Screening: cffDNA ordered   [x] bASA: Not needed  [x] Anatomy US:   [x] 1hr GCT at 24-28wks:  [x] Tdap (27-36wks): 24  [x] Flu:   [x] Rhogam (if Rh neg): N/A  [] GBS at 36 wks: collected 10/22/24   [x] Breastfeeding: planning to pump and bottle feed  [] Postpartum Birth control method:   [] 39 weeks discussion of IOL vs. Expectant management: considering IOL  [x] Mode of delivery:  vaginal         Relevant Orders    Group B Streptococcus (GBS) Prenatal Screen, Culture    Iron deficiency anemia during pregnancy ([facility])    Overview "     - Hgb 10.2 on 7/15  - Unable to tolerate oral iron, received IV Dextran x1 on 8/8  - Hgb dropped further to 9.4 on 8/15 while inpatient  - Per Dr. Melgar, repeat CBC / Retic in two weeks to allow more time to respond to therapy  29w4d hgb improved to 10.7, no retic completed, ferritin 172         Current Assessment & Plan     Started liquid iron, NovaFerrum 125mg elemental iron daily  Plan to recheck labs today         Relevant Orders    CBC Anemia Panel With Reflex,Pregnancy    Reticulocytes    Heart palpitations    Overview     Holter monitor showed sinus rhythm   S/p cardiology consult 9/30, TTE scheduled 10/22         Current Assessment & Plan     Has TTE today, remains asymptomatic            OBGYN Infusion    Iron deficiency anemia during pregnancy ([facility])    Overview     - Hgb 10.2 on 7/15  - Unable to tolerate oral iron, received IV Dextran x1 on 8/8  - Hgb dropped further to 9.4 on 8/15 while inpatient  - Per Dr. Melgar, repeat CBC / Retic in two weeks to allow more time to respond to therapy  29w4d hgb improved to 10.7, no retic completed, ferritin 172         Current Assessment & Plan     Started liquid iron, NovaFerrum 125mg elemental iron daily  Plan to recheck labs today         Relevant Orders    CBC Anemia Panel With Reflex,Pregnancy    Reticulocytes     Other Visit Diagnoses       Encounter for supervision of other normal pregnancy in third trimester    -  Primary    Relevant Orders    Group B Streptococcus (GBS) Prenatal Screen, Culture    Anemia of mother in pregnancy, delivered with postpartum condition ([facility])              Discussed routine GBS screening, to be completed today  Discussed expectant management vs. scheduling term IOL, pt undecided  Reviewed s/sx of PTL, warning signs, fetal movement counts, and when to call provider  Follow up in 1 week for a routine prenatal visit.    Anamaria Puri, APRN-CNM, APRN-CNP

## 2024-10-22 NOTE — ASSESSMENT & PLAN NOTE
Has TTE today, remains asymptomatic  
Started liquid iron, NovaFerrum 125mg elemental iron daily  Plan to recheck labs today  
oral

## 2024-10-24 ENCOUNTER — TELEPHONE (OUTPATIENT)
Dept: CARDIOLOGY | Facility: CLINIC | Age: 24
End: 2024-10-24
Payer: COMMERCIAL

## 2024-10-24 PROBLEM — O36.60X0: Status: ACTIVE | Noted: 2024-10-24

## 2024-10-24 LAB — GP B STREP GENITAL QL CULT: NORMAL

## 2024-10-29 ENCOUNTER — DOCUMENTATION (OUTPATIENT)
Dept: OBSTETRICS AND GYNECOLOGY | Facility: HOSPITAL | Age: 24
End: 2024-10-29

## 2024-10-29 ENCOUNTER — ROUTINE PRENATAL (OUTPATIENT)
Dept: OBSTETRICS AND GYNECOLOGY | Facility: HOSPITAL | Age: 24
End: 2024-10-29
Payer: COMMERCIAL

## 2024-10-29 VITALS — BODY MASS INDEX: 29.05 KG/M2 | DIASTOLIC BLOOD PRESSURE: 75 MMHG | WEIGHT: 164 LBS | SYSTOLIC BLOOD PRESSURE: 118 MMHG

## 2024-10-29 DIAGNOSIS — O99.019 IRON DEFICIENCY ANEMIA DURING PREGNANCY (HHS-HCC): ICD-10-CM

## 2024-10-29 DIAGNOSIS — D50.9 IRON DEFICIENCY ANEMIA DURING PREGNANCY (HHS-HCC): ICD-10-CM

## 2024-10-29 DIAGNOSIS — O36.60X0: ICD-10-CM

## 2024-10-29 DIAGNOSIS — R00.2 HEART PALPITATIONS: ICD-10-CM

## 2024-10-29 DIAGNOSIS — Z3A.37 37 WEEKS GESTATION OF PREGNANCY (HHS-HCC): Primary | ICD-10-CM

## 2024-10-29 PROBLEM — O26.03 EXCESSIVE WEIGHT GAIN DURING PREGNANCY IN THIRD TRIMESTER (HHS-HCC): Status: ACTIVE | Noted: 2024-10-29

## 2024-10-29 PROCEDURE — 0501F PRENATAL FLOW SHEET: CPT | Performed by: ADVANCED PRACTICE MIDWIFE

## 2024-11-05 ENCOUNTER — ROUTINE PRENATAL (OUTPATIENT)
Dept: OBSTETRICS AND GYNECOLOGY | Facility: HOSPITAL | Age: 24
End: 2024-11-05
Payer: COMMERCIAL

## 2024-11-05 ENCOUNTER — TELEPHONE (OUTPATIENT)
Dept: OBSTETRICS AND GYNECOLOGY | Facility: HOSPITAL | Age: 24
End: 2024-11-05

## 2024-11-05 VITALS — DIASTOLIC BLOOD PRESSURE: 74 MMHG | BODY MASS INDEX: 29.76 KG/M2 | WEIGHT: 168 LBS | SYSTOLIC BLOOD PRESSURE: 113 MMHG

## 2024-11-05 DIAGNOSIS — Z34.83 ENCOUNTER FOR SUPERVISION OF OTHER NORMAL PREGNANCY IN THIRD TRIMESTER: Primary | ICD-10-CM

## 2024-11-05 DIAGNOSIS — Z3A.38 38 WEEKS GESTATION OF PREGNANCY (HHS-HCC): ICD-10-CM

## 2024-11-05 DIAGNOSIS — Z34.90 ENCOUNTER FOR INDUCTION OF LABOR: Primary | ICD-10-CM

## 2024-11-05 NOTE — PROGRESS NOTES
"Reynold Lane \"Cydney\" is a 24 y.o.  at 38w1d with a working estimated date of delivery of 2024, by Ultrasound who presents for a routine prenatal visit.     She denies vaginal bleeding, leakage of fluid, decreased fetal movements. She is feeling mild contractions. Desires rrIOL at 39w    Objective   Physical Exam:   Weight: 76.2 kg (168 lb)  TW.2 kg (48 lb 15.2 oz)  BP: 113/74  Fetal Heart Rate: 155 Fundal Height (cm): 38 cm Presentation: Vertex         Postpartum Depression: Low Risk  (2024)    [place]  Depression Scale     Last EPDS Total Score: 0     Last EPDS Self Harm Result: Never      Prenatal Labs  Lab Results   Component Value Date    HGB 11.3 (L) 10/22/2024    HCT 35.1 (L) 10/22/2024     10/22/2024    SYPHT Nonreactive 2024     Lab Results   Component Value Date    GLUC1P 123 2024     Group B Strep Screen   Date Value Ref Range Status   10/22/2024 No Group B Streptococcus (GBS) isolated  Final     Assessment/Plan   Problem List Items Addressed This Visit          Pregnancy    38 weeks gestation of pregnancy ([facility])    Overview     [x] Initial BMI: 20  [x] Dating US: 8w  [x] Prenatal Labs: NEON 3/25  [x] Pap: NEON   [x] Genetic Screening: cffDNA ordered   [x] bASA: Not needed  [x] Anatomy US:   [x] 1hr GCT at 24-28wks:  [x] Tdap (27-36wks): 24  [x] Flu:   [x] [x] GBS at 36 wks: negative  [x] Breastfeeding: planning to pump and bottle feed  [x] Postpartum Birth control method: interval BTL, MA 31 signed 10/29/24   [] Bridge method?  [x] 39 weeks discussion of IOL vs. Expectant management: expectant  [x] Mode of delivery:  vaginal          Other Visit Diagnoses       Encounter for supervision of other normal pregnancy in third trimester    -  Primary          Discussed expectant management vs. scheduling term IOL, pt desires rrIOL  Discussed expectations and methods used for IOL  Reviewed s/sx of labor, warning signs, fetal " movement counts, and when to call provider  Follow up in 1 week for a routine prenatal visit.    Anamaria Puri, APRN-CNM, APRN-CNP

## 2024-11-05 NOTE — TELEPHONE ENCOUNTER
----- Message from Anamaria Puri sent at 11/5/2024 11:02 AM EST -----  Cydney Bejarano would like to schedule a 39 week IOL. She is hoping for 11/11 if there is an open spot    Thank you!  Anamaria    Patient scheduled for rrIOL at 39.1 wga on 11/12 at 2pm  Patient aware of date and time to arrive to L&D  Labor induction order pended to provider for signing  Induction letter to be sent to patient via [facility]  Encouraged patient to call office with any questions or concerns  Harmony Chavez RN

## 2024-11-12 ENCOUNTER — ANESTHESIA EVENT (OUTPATIENT)
Dept: OBSTETRICS AND GYNECOLOGY | Facility: HOSPITAL | Age: 24
End: 2024-11-12
Payer: COMMERCIAL

## 2024-11-12 ENCOUNTER — ANESTHESIA (OUTPATIENT)
Dept: OBSTETRICS AND GYNECOLOGY | Facility: HOSPITAL | Age: 24
End: 2024-11-12
Payer: COMMERCIAL

## 2024-11-12 ENCOUNTER — HOSPITAL ENCOUNTER (INPATIENT)
Facility: HOSPITAL | Age: 24
LOS: 3 days | Discharge: HOME | End: 2024-11-15
Attending: OBSTETRICS & GYNECOLOGY | Admitting: NURSE PRACTITIONER
Payer: COMMERCIAL

## 2024-11-12 ENCOUNTER — APPOINTMENT (OUTPATIENT)
Dept: OBSTETRICS AND GYNECOLOGY | Facility: HOSPITAL | Age: 24
End: 2024-11-12
Payer: COMMERCIAL

## 2024-11-12 DIAGNOSIS — Z34.90 ENCOUNTER FOR INDUCTION OF LABOR: ICD-10-CM

## 2024-11-12 PROBLEM — Z3A.39 39 WEEKS GESTATION OF PREGNANCY (HHS-HCC): Status: ACTIVE | Noted: 2024-04-30

## 2024-11-12 LAB
ABO GROUP (TYPE) IN BLOOD: NORMAL
ANTIBODY SCREEN: NORMAL
ERYTHROCYTE [DISTWIDTH] IN BLOOD BY AUTOMATED COUNT: 15.8 % (ref 11.5–14.5)
HCT VFR BLD AUTO: 37.1 % (ref 36–46)
HGB BLD-MCNC: 12 G/DL (ref 12–16)
MCH RBC QN AUTO: 24.2 PG (ref 26–34)
MCHC RBC AUTO-ENTMCNC: 32.3 G/DL (ref 32–36)
MCV RBC AUTO: 75 FL (ref 80–100)
NRBC BLD-RTO: 0 /100 WBCS (ref 0–0)
PLATELET # BLD AUTO: 276 X10*3/UL (ref 150–450)
RBC # BLD AUTO: 4.96 X10*6/UL (ref 4–5.2)
RH FACTOR (ANTIGEN D): NORMAL
TREPONEMA PALLIDUM IGG+IGM AB [PRESENCE] IN SERUM OR PLASMA BY IMMUNOASSAY: NONREACTIVE
WBC # BLD AUTO: 7.9 X10*3/UL (ref 4.4–11.3)

## 2024-11-12 PROCEDURE — 86780 TREPONEMA PALLIDUM: CPT | Performed by: NURSE PRACTITIONER

## 2024-11-12 PROCEDURE — 7210000002 HC LABOR PER HOUR

## 2024-11-12 PROCEDURE — 2500000004 HC RX 250 GENERAL PHARMACY W/ HCPCS (ALT 636 FOR OP/ED): Performed by: NURSE PRACTITIONER

## 2024-11-12 PROCEDURE — 86900 BLOOD TYPING SEROLOGIC ABO: CPT | Performed by: NURSE PRACTITIONER

## 2024-11-12 PROCEDURE — 10907ZC DRAINAGE OF AMNIOTIC FLUID, THERAPEUTIC FROM PRODUCTS OF CONCEPTION, VIA NATURAL OR ARTIFICIAL OPENING: ICD-10-PCS | Performed by: NURSE PRACTITIONER

## 2024-11-12 PROCEDURE — 1120000001 HC OB PRIVATE ROOM DAILY

## 2024-11-12 PROCEDURE — 85027 COMPLETE CBC AUTOMATED: CPT | Performed by: NURSE PRACTITIONER

## 2024-11-12 PROCEDURE — 36415 COLL VENOUS BLD VENIPUNCTURE: CPT | Performed by: NURSE PRACTITIONER

## 2024-11-12 PROCEDURE — 10H07YZ INSERTION OF OTHER DEVICE INTO PRODUCTS OF CONCEPTION, VIA NATURAL OR ARTIFICIAL OPENING: ICD-10-PCS | Performed by: NURSE PRACTITIONER

## 2024-11-12 PROCEDURE — 59050 FETAL MONITOR W/REPORT: CPT

## 2024-11-12 RX ORDER — METOCLOPRAMIDE HYDROCHLORIDE 5 MG/ML
10 INJECTION INTRAMUSCULAR; INTRAVENOUS EVERY 6 HOURS PRN
Status: DISCONTINUED | OUTPATIENT
Start: 2024-11-12 | End: 2024-11-13 | Stop reason: HOSPADM

## 2024-11-12 RX ORDER — LIDOCAINE HYDROCHLORIDE 10 MG/ML
30 INJECTION, SOLUTION INFILTRATION; PERINEURAL ONCE AS NEEDED
Status: DISCONTINUED | OUTPATIENT
Start: 2024-11-12 | End: 2024-11-13 | Stop reason: HOSPADM

## 2024-11-12 RX ORDER — TERBUTALINE SULFATE 1 MG/ML
0.25 INJECTION SUBCUTANEOUS ONCE AS NEEDED
Status: DISCONTINUED | OUTPATIENT
Start: 2024-11-12 | End: 2024-11-13 | Stop reason: HOSPADM

## 2024-11-12 RX ORDER — NIFEDIPINE 10 MG/1
10 CAPSULE ORAL ONCE AS NEEDED
Status: DISCONTINUED | OUTPATIENT
Start: 2024-11-12 | End: 2024-11-13 | Stop reason: HOSPADM

## 2024-11-12 RX ORDER — METOCLOPRAMIDE 10 MG/1
10 TABLET ORAL EVERY 6 HOURS PRN
Status: DISCONTINUED | OUTPATIENT
Start: 2024-11-12 | End: 2024-11-13 | Stop reason: HOSPADM

## 2024-11-12 RX ORDER — LOPERAMIDE HYDROCHLORIDE 2 MG/1
4 CAPSULE ORAL EVERY 2 HOUR PRN
Status: DISCONTINUED | OUTPATIENT
Start: 2024-11-12 | End: 2024-11-13 | Stop reason: HOSPADM

## 2024-11-12 RX ORDER — CARBOPROST TROMETHAMINE 250 UG/ML
250 INJECTION, SOLUTION INTRAMUSCULAR ONCE AS NEEDED
Status: DISCONTINUED | OUTPATIENT
Start: 2024-11-12 | End: 2024-11-13 | Stop reason: HOSPADM

## 2024-11-12 RX ORDER — LABETALOL HYDROCHLORIDE 5 MG/ML
20 INJECTION, SOLUTION INTRAVENOUS ONCE AS NEEDED
Status: DISCONTINUED | OUTPATIENT
Start: 2024-11-12 | End: 2024-11-13 | Stop reason: HOSPADM

## 2024-11-12 RX ORDER — FENTANYL/ROPIVACAINE/NS/PF 2MCG/ML-.2
0-25 PLASTIC BAG, INJECTION (ML) INJECTION CONTINUOUS
Status: DISCONTINUED | OUTPATIENT
Start: 2024-11-12 | End: 2024-11-13

## 2024-11-12 RX ORDER — OXYTOCIN/0.9 % SODIUM CHLORIDE 30/500 ML
60 PLASTIC BAG, INJECTION (ML) INTRAVENOUS
Status: DISCONTINUED | OUTPATIENT
Start: 2024-11-12 | End: 2024-11-13 | Stop reason: HOSPADM

## 2024-11-12 RX ORDER — HYDRALAZINE HYDROCHLORIDE 20 MG/ML
5 INJECTION INTRAMUSCULAR; INTRAVENOUS ONCE AS NEEDED
Status: DISCONTINUED | OUTPATIENT
Start: 2024-11-12 | End: 2024-11-13 | Stop reason: HOSPADM

## 2024-11-12 RX ORDER — OXYTOCIN/0.9 % SODIUM CHLORIDE 30/500 ML
2-30 PLASTIC BAG, INJECTION (ML) INTRAVENOUS CONTINUOUS
Status: DISCONTINUED | OUTPATIENT
Start: 2024-11-12 | End: 2024-11-15 | Stop reason: HOSPADM

## 2024-11-12 RX ORDER — MISOPROSTOL 200 UG/1
800 TABLET ORAL ONCE AS NEEDED
Status: DISCONTINUED | OUTPATIENT
Start: 2024-11-12 | End: 2024-11-13 | Stop reason: HOSPADM

## 2024-11-12 RX ORDER — OXYTOCIN 10 [USP'U]/ML
10 INJECTION, SOLUTION INTRAMUSCULAR; INTRAVENOUS ONCE AS NEEDED
Status: DISCONTINUED | OUTPATIENT
Start: 2024-11-12 | End: 2024-11-13 | Stop reason: HOSPADM

## 2024-11-12 RX ORDER — TRANEXAMIC ACID 100 MG/ML
1000 INJECTION, SOLUTION INTRAVENOUS ONCE AS NEEDED
Status: DISCONTINUED | OUTPATIENT
Start: 2024-11-12 | End: 2024-11-13 | Stop reason: HOSPADM

## 2024-11-12 RX ORDER — ONDANSETRON 4 MG/1
4 TABLET, FILM COATED ORAL EVERY 6 HOURS PRN
Status: DISCONTINUED | OUTPATIENT
Start: 2024-11-12 | End: 2024-11-13 | Stop reason: HOSPADM

## 2024-11-12 RX ORDER — WATER
240 LIQUID (ML) MISCELLANEOUS
Status: DISCONTINUED | OUTPATIENT
Start: 2024-11-12 | End: 2024-11-13 | Stop reason: HOSPADM

## 2024-11-12 RX ORDER — METHYLERGONOVINE MALEATE 0.2 MG/ML
0.2 INJECTION INTRAVENOUS ONCE AS NEEDED
Status: DISCONTINUED | OUTPATIENT
Start: 2024-11-12 | End: 2024-11-13 | Stop reason: HOSPADM

## 2024-11-12 RX ORDER — ONDANSETRON HYDROCHLORIDE 2 MG/ML
4 INJECTION, SOLUTION INTRAVENOUS EVERY 6 HOURS PRN
Status: DISCONTINUED | OUTPATIENT
Start: 2024-11-12 | End: 2024-11-13 | Stop reason: HOSPADM

## 2024-11-12 ASSESSMENT — PAIN SCALES - GENERAL
PAINLEVEL_OUTOF10: 0 - NO PAIN

## 2024-11-12 NOTE — LETTER
November 13, 2024     Patient: Keyla Lane   YOB: 2000   Date of Visit: 11/12/2024       To Whom It May Concern:    This is to certify that Keyla Lane gave birth on 11/13/24.     If you have any questions or concerns, please don't hesitate to call.    Sincerely,     SAVANA Mario

## 2024-11-13 PROCEDURE — 2500000001 HC RX 250 WO HCPCS SELF ADMINISTERED DRUGS (ALT 637 FOR MEDICARE OP): Performed by: ADVANCED PRACTICE MIDWIFE

## 2024-11-13 PROCEDURE — 7210000002 HC LABOR PER HOUR

## 2024-11-13 PROCEDURE — 51701 INSERT BLADDER CATHETER: CPT

## 2024-11-13 PROCEDURE — 0UQMXZZ REPAIR VULVA, EXTERNAL APPROACH: ICD-10-PCS | Performed by: NURSE PRACTITIONER

## 2024-11-13 PROCEDURE — 2500000004 HC RX 250 GENERAL PHARMACY W/ HCPCS (ALT 636 FOR OP/ED): Performed by: STUDENT IN AN ORGANIZED HEALTH CARE EDUCATION/TRAINING PROGRAM

## 2024-11-13 PROCEDURE — 1210000001 HC SEMI-PRIVATE ROOM DAILY

## 2024-11-13 PROCEDURE — 59409 OBSTETRICAL CARE: CPT | Performed by: ADVANCED PRACTICE MIDWIFE

## 2024-11-13 PROCEDURE — 3700000014 EPIDURAL BLOCK: Performed by: STUDENT IN AN ORGANIZED HEALTH CARE EDUCATION/TRAINING PROGRAM

## 2024-11-13 PROCEDURE — 2500000004 HC RX 250 GENERAL PHARMACY W/ HCPCS (ALT 636 FOR OP/ED): Performed by: NURSE PRACTITIONER

## 2024-11-13 PROCEDURE — 7100000016 HC LABOR RECOVERY PER HOUR

## 2024-11-13 PROCEDURE — 59400 OBSTETRICAL CARE: CPT | Performed by: ADVANCED PRACTICE MIDWIFE

## 2024-11-13 RX ORDER — OXYTOCIN 10 [USP'U]/ML
10 INJECTION, SOLUTION INTRAMUSCULAR; INTRAVENOUS ONCE AS NEEDED
Status: DISCONTINUED | OUTPATIENT
Start: 2024-11-13 | End: 2024-11-15 | Stop reason: HOSPADM

## 2024-11-13 RX ORDER — IBUPROFEN 600 MG/1
600 TABLET ORAL EVERY 6 HOURS
Status: DISCONTINUED | OUTPATIENT
Start: 2024-11-13 | End: 2024-11-13

## 2024-11-13 RX ORDER — ACETAMINOPHEN 160 MG/5ML
975 SOLUTION ORAL EVERY 6 HOURS PRN
Status: DISCONTINUED | OUTPATIENT
Start: 2024-11-13 | End: 2024-11-13

## 2024-11-13 RX ORDER — ONDANSETRON 4 MG/1
4 TABLET, FILM COATED ORAL EVERY 6 HOURS PRN
Status: DISCONTINUED | OUTPATIENT
Start: 2024-11-13 | End: 2024-11-15 | Stop reason: HOSPADM

## 2024-11-13 RX ORDER — TRANEXAMIC ACID 100 MG/ML
1000 INJECTION, SOLUTION INTRAVENOUS ONCE AS NEEDED
Status: DISCONTINUED | OUTPATIENT
Start: 2024-11-13 | End: 2024-11-15 | Stop reason: HOSPADM

## 2024-11-13 RX ORDER — ACETAMINOPHEN 160 MG/5ML
975 SOLUTION ORAL EVERY 6 HOURS SCHEDULED
Status: DISCONTINUED | OUTPATIENT
Start: 2024-11-13 | End: 2024-11-15 | Stop reason: HOSPADM

## 2024-11-13 RX ORDER — TRIPROLIDINE/PSEUDOEPHEDRINE 2.5MG-60MG
600 TABLET ORAL EVERY 6 HOURS PRN
Status: DISCONTINUED | OUTPATIENT
Start: 2024-11-13 | End: 2024-11-13

## 2024-11-13 RX ORDER — LABETALOL HYDROCHLORIDE 5 MG/ML
20 INJECTION, SOLUTION INTRAVENOUS ONCE AS NEEDED
Status: DISCONTINUED | OUTPATIENT
Start: 2024-11-13 | End: 2024-11-15 | Stop reason: HOSPADM

## 2024-11-13 RX ORDER — METHYLERGONOVINE MALEATE 0.2 MG/ML
0.2 INJECTION INTRAVENOUS ONCE AS NEEDED
Status: DISCONTINUED | OUTPATIENT
Start: 2024-11-13 | End: 2024-11-15 | Stop reason: HOSPADM

## 2024-11-13 RX ORDER — ONDANSETRON HYDROCHLORIDE 2 MG/ML
4 INJECTION, SOLUTION INTRAVENOUS EVERY 6 HOURS PRN
Status: DISCONTINUED | OUTPATIENT
Start: 2024-11-13 | End: 2024-11-15 | Stop reason: HOSPADM

## 2024-11-13 RX ORDER — ACETAMINOPHEN 325 MG/1
975 TABLET ORAL EVERY 6 HOURS
Status: DISCONTINUED | OUTPATIENT
Start: 2024-11-13 | End: 2024-11-13

## 2024-11-13 RX ORDER — OXYTOCIN/0.9 % SODIUM CHLORIDE 30/500 ML
60 PLASTIC BAG, INJECTION (ML) INTRAVENOUS ONCE AS NEEDED
Status: DISCONTINUED | OUTPATIENT
Start: 2024-11-13 | End: 2024-11-15 | Stop reason: HOSPADM

## 2024-11-13 RX ORDER — HYDRALAZINE HYDROCHLORIDE 20 MG/ML
5 INJECTION INTRAMUSCULAR; INTRAVENOUS ONCE AS NEEDED
Status: DISCONTINUED | OUTPATIENT
Start: 2024-11-13 | End: 2024-11-15 | Stop reason: HOSPADM

## 2024-11-13 RX ORDER — ADHESIVE BANDAGE
10 BANDAGE TOPICAL
Status: DISCONTINUED | OUTPATIENT
Start: 2024-11-13 | End: 2024-11-15 | Stop reason: HOSPADM

## 2024-11-13 RX ORDER — LIDOCAINE 560 MG/1
1 PATCH PERCUTANEOUS; TOPICAL; TRANSDERMAL
Status: DISCONTINUED | OUTPATIENT
Start: 2024-11-13 | End: 2024-11-15 | Stop reason: HOSPADM

## 2024-11-13 RX ORDER — DIPHENHYDRAMINE HYDROCHLORIDE 50 MG/ML
25 INJECTION INTRAMUSCULAR; INTRAVENOUS EVERY 6 HOURS PRN
Status: DISCONTINUED | OUTPATIENT
Start: 2024-11-13 | End: 2024-11-15 | Stop reason: HOSPADM

## 2024-11-13 RX ORDER — MISOPROSTOL 200 UG/1
800 TABLET ORAL ONCE AS NEEDED
Status: DISCONTINUED | OUTPATIENT
Start: 2024-11-13 | End: 2024-11-15 | Stop reason: HOSPADM

## 2024-11-13 RX ORDER — NIFEDIPINE 10 MG/1
10 CAPSULE ORAL ONCE AS NEEDED
Status: DISCONTINUED | OUTPATIENT
Start: 2024-11-13 | End: 2024-11-15 | Stop reason: HOSPADM

## 2024-11-13 RX ORDER — BISACODYL 10 MG/1
10 SUPPOSITORY RECTAL DAILY PRN
Status: DISCONTINUED | OUTPATIENT
Start: 2024-11-13 | End: 2024-11-15 | Stop reason: HOSPADM

## 2024-11-13 RX ORDER — CARBOPROST TROMETHAMINE 250 UG/ML
250 INJECTION, SOLUTION INTRAMUSCULAR ONCE AS NEEDED
Status: DISCONTINUED | OUTPATIENT
Start: 2024-11-13 | End: 2024-11-15 | Stop reason: HOSPADM

## 2024-11-13 RX ORDER — LOPERAMIDE HYDROCHLORIDE 2 MG/1
4 CAPSULE ORAL EVERY 2 HOUR PRN
Status: DISCONTINUED | OUTPATIENT
Start: 2024-11-13 | End: 2024-11-15 | Stop reason: HOSPADM

## 2024-11-13 RX ORDER — SIMETHICONE 80 MG
80 TABLET,CHEWABLE ORAL 4 TIMES DAILY PRN
Status: DISCONTINUED | OUTPATIENT
Start: 2024-11-13 | End: 2024-11-15 | Stop reason: HOSPADM

## 2024-11-13 RX ORDER — FENTANYL/ROPIVACAINE/NS/PF 2MCG/ML-.2
0-25 PLASTIC BAG, INJECTION (ML) INJECTION CONTINUOUS
Status: DISCONTINUED | OUTPATIENT
Start: 2024-11-13 | End: 2024-11-15 | Stop reason: HOSPADM

## 2024-11-13 RX ORDER — TRIPROLIDINE/PSEUDOEPHEDRINE 2.5MG-60MG
600 TABLET ORAL EVERY 6 HOURS SCHEDULED
Status: DISCONTINUED | OUTPATIENT
Start: 2024-11-13 | End: 2024-11-15 | Stop reason: HOSPADM

## 2024-11-13 RX ORDER — POLYETHYLENE GLYCOL 3350 17 G/17G
17 POWDER, FOR SOLUTION ORAL 2 TIMES DAILY PRN
Status: DISCONTINUED | OUTPATIENT
Start: 2024-11-13 | End: 2024-11-15 | Stop reason: HOSPADM

## 2024-11-13 RX ORDER — DIPHENHYDRAMINE HCL 25 MG
25 CAPSULE ORAL EVERY 6 HOURS PRN
Status: DISCONTINUED | OUTPATIENT
Start: 2024-11-13 | End: 2024-11-15 | Stop reason: HOSPADM

## 2024-11-13 ASSESSMENT — PAIN SCALES - GENERAL
PAINLEVEL_OUTOF10: 0 - NO PAIN

## 2024-11-13 NOTE — ANESTHESIA PROCEDURE NOTES
Epidural Block    Patient location during procedure: floor  Start time: 11/13/2024 12:18 AM  End time: 11/13/2024 12:29 AM  Reason for block: labor analgesia  Staffing  Performed: GUME   Authorized by: Kvng Polk MD    Performed by: DEDRA Miller    Preanesthetic Checklist  Completed: patient identified, IV checked, risks and benefits discussed, surgical consent, pre-op evaluation, timeout performed and sterile techniques followed  Block Timeout  RN/Licensed healthcare professional reads aloud to the Anesthesia provider and entire team: Patient identity, procedure with side and site, patient position, and as applicable the availability of implants/special equipment/special requirements.  Patient on coagulant treatment: no  Timeout performed at: 11/13/2024 12:18 AM  Block Placement  Patient position: sitting  Prep: ChloraPrep  Sterility prep: cap, drape, gloves and mask  Sedation level: no sedation  Patient monitoring: blood pressure, continuous pulse oximetry and heart rate  Approach: midline  Local numbing: lidocaine 1% to skin and subcutaneous tissues  Vertebral space: lumbar  Lumbar location: L3-L4  Epidural  Loss of resistance technique: saline  Guidance: landmark technique        Needle  Needle type: Tuohy   Needle gauge: 17  Needle length: 8.9cm  Needle insertion depth: 5 cm  Catheter type: multi-orifice  Catheter size: 19 G  Catheter at skin depth: 10 cm  Catheter securement method: clear occlusive dressing and liquid medical adhesive    Test dose: lidocaine 1.5% with epinephrine 1-to-200,000  Test dose: lidocaine 1.5% with epinephrine 1-to-200,000  Test dose result: no positive test dose    PCEA  Medication concentration used: 0.044% Bupivacaine with 1.25 mcg/mL Fentanyl and 1:[id number] Epinephrine  Dose (mL): 4  Lockout (minutes): 30  1-Hour Limit (boluses/hr): 2  Basal Rate: 10        Assessment  Events: no positive test dose  Procedure assessment: patient tolerated procedure well with no  immediate complications

## 2024-11-13 NOTE — L&D DELIVERY NOTE
"OB Delivery Note  2024  Keylaemily Lane \"Cydney\"  24 y.o.   Vaginal, Spontaneous       Gestational Age: 39w2d  /Para:   Quantitative Blood Loss: Admission to Discharge: 459 mL (2024  7:39 PM - 2024  2:51 PM)    Hanna LaneMitrasavanahcris [[id number]]      Labor Events    Rupture date/time: 2024  Rupture type: Artificial  Fluid color: Clear  Fluid odor: None  Labor type: Induced Onset of Labor  Labor allowed to proceed with plans for an attempted vaginal birth?: Yes  Induction: Oxytocin, AROM  Induction date/time: 2024  Induction indications: Risk Reducing  Complications: None       Labor Event Times    Labor onset date/time: 2024  Dilation complete date/time: 202448  Start pushing date/time: 2024 0853       Labor Length    1st stage: 13h 09m  2nd stage: 0h 14m  3rd stage: 0h 07m       Placenta    Placenta delivery date/time: 2024 0909  Placenta removal: Spontaneous  Placenta disposition: discarded       Cord    Vessels: 3 vessels  Complications: None  Delayed cord clamping?: Yes  Cord clamped date/time: 2024 09:03:00  Cord blood disposition: Lab  Gases sent?: No  Stem cell collection (by provider): No       Lacerations    Episiotomy: None  Perineal laceration: None  Labial laceration?: Yes  Labial laceration location: bilateral  Labial laceration repaired?: Yes  Repair suture: 4-0 Synthetic Suture       Anesthesia    Method: Epidural       Operative Delivery    Forceps attempted?: No  Vacuum extractor attempted?: No       Shoulder Dystocia    Shoulder dystocia present?: No       [place] Delivery    Birth date/time: 2024 09:02:00  Delivery type: Vaginal, Spontaneous  Complications: None       Resuscitation    Method: Suctioning, Tactile stimulation       Apgars    Living status: Living  Apgar Component Scores:  1 min.:  5 min.:  10 min.:  15 min.:  20 min.:    Skin color:  0  1       Heart rate:  2  2       Reflex " irritability:  2  2       Muscle tone:  1  2       Respiratory effort:  2  2       Total:  7  9       Apgars assigned by: CHRIS RIVERS       Delivery Providers    Delivering clinician: SAVANA Mario   Provider Role    Selena White RN Delivery Nurse    Chris Rivers RN Nursery Nurse               Patient pushed with good effort. Head delivered, restituted to GILMAR. Nuchal cord present. Delivered through. Shoulders delivered with gentle downward traction following restitution. Postpartum pitocin bolus initiated immediately after birth. Infant placed on maternal abdomen for skin to skin. Cord clamped and cut by FOB after +4 min delay. Placenta delivered spontaneously and with gentle downward traction. Fundus palpated firm, midline, and at umbilicus. Vagina, perineum, and labia inspected. Bilateral labial lacerations noted. Right labial laceration repaired with 4-0 vicryl.  mL. Mom and baby stable.    SAVANA Mario

## 2024-11-13 NOTE — PROGRESS NOTES
"Assessment    24 y.o.  at 39w2d  FHT Category 1  Active labor  GBS neg    Plan    Patient repositioned to hands and knees  Encourage frequent position changes as tolerated  Continue pitocin per protocol  Continue assessment of maternal and fetal wellbeing  Recheck as clinically indicated by maternal or fetal status  Anticipate second stage of labor    Gin Esposito, APRN-CNM, APRN-CNP    Subjective:  Keyla Lane \"Cydney\" is comfortable with epidural, denies feeling contractions or rectal pressure.    Objective:  Fetal Monitoring      Baseline FHR: 135 per minute  Variability: moderate  Accelerations: no  Decelerations: early  TOCO: regular, every 2-3 minutes    Cervical Exam:  9 cm dilated, 100 effaced, 0 station; swollen cervix between 11-1 o'clock    Membrane status: ruptured (AROM 2047)    Pitocin is at 4 mu/min.    Vitals:    24 0335 24 0438 24 0525 24 0526   BP: 94/52 108/57  116/54   Pulse: 83 87 96 97   Resp: 16 16  16   Temp: 36.3 °C (97.3 °F) 36.4 °C (97.5 °F) 36.3 °C (97.3 °F)    TempSrc: Temporal Temporal Temporal    SpO2: 99%  97%    Weight:          "

## 2024-11-13 NOTE — PROGRESS NOTES
Intrapartum Progress Note  Subjective   Keyla Lane is a 24 y.o.  at 39w2d. EDWARD: 2024, by Ultrasound.     Patient very comfortable. Not feeling any pressure. Amenable to SVE. After unchanged SVE declined IUPC placement.     Objective   Last Vitals:  Temp Pulse Resp BP MAP Pulse Ox   36.4 °C (97.5 °F) 104 16 117/56   97 %     FHTs: 145 baseline, moderate variability, +accel, +variables  UC: q1-2 min   SVE: 9/100/0, anterior aspect of cervix slightly swollen, cervix on anterior/left/posterior    Assessment   IUP at 39w2  FHTs Cat II  Pit at 4    Plan   -resuscitation measures in place : patient repositioned   -continue to increase pit per protocol   -continue to work on position changes  -discussed with patient about benefit of IUPC in order to determine if contractions were adequate or not  -continue to monitor FHTs and VS closely   -Leonardo Arango and Afshan aware of exam  -reviewed there are no reliable factors that allow for the accurate prediction of shoulder dystocia, factors that would play into decision making re: route of delivery include the estimated fetal weight, gestational age, maternal glucose intolerance, and the severity of the prior  injury  -reviewed ultrasound can be off by 15% +/- in the third trimester of pregnancy. On average a fetus gain roughly 150-300 grams/week in the third trimester  -after discussion patient would like to continue with vaginal birth   -reassess in 1hr or prn     SAVANA Mario

## 2024-11-13 NOTE — CARE PLAN
Problem: Postpartum  Goal: Experiences normal postpartum course  Outcome: Progressing  Goal: Appropriate maternal -  bonding  Outcome: Progressing     Problem: Pain - Adult  Goal: Verbalizes/displays adequate comfort level or baseline comfort level  Outcome: Progressing       Over the shift, the patient made progress toward the following goals. The patient has managed her pain appropriately.

## 2024-11-13 NOTE — ANESTHESIA PREPROCEDURE EVALUATION
"Patient: Keyla Lane \"Cydney\"    Evaluation Method: In-person visit    Procedure Information    Date: 11/12/24  Procedure: Labor Analgesia         Relevant Problems   Anesthesia (within normal limits)      Cardiac (within normal limits)      Pulmonary (within normal limits)      Neuro   (+) Sciatic nerve pain, left      GI (within normal limits)      /Renal (within normal limits)      Liver (within normal limits)      Endocrine (within normal limits)      Hematology   (+) Iron deficiency anemia during pregnancy ([facility])      Musculoskeletal (within normal limits)      GYN   (+) 39 weeks gestation of pregnancy ([facility])       Clinical information reviewed:   Tobacco  Allergies  Meds  Problems  Med Hx  Surg Hx   Fam Hx  Soc   Hx        NPO Detail:  No data recorded     OB/Gyn Evaluation    Present Pregnancy    Patient is pregnant now.   Obstetric History                Physical Exam    Airway  Mallampati: III  TM distance: >3 FB     Cardiovascular    Dental        Pulmonary    Abdominal            Anesthesia Plan    History of general anesthesia?: no  History of complications of general anesthesia?: unknown/emergency    ASA 2     epidural     Use of blood products discussed with patient who consented to blood products.    Plan discussed with CAA.        "

## 2024-11-13 NOTE — PROGRESS NOTES
"Assessment    24 y.o.  at 39w2d  FHT Category 1  Active labor  Anemia, s/p PO iron and IV iron; admission hgb =12.0  H/o heart palpitations during pregnancy - echo WNL, resting HR 110s, asymptomatic   GBS neg    Plan    Discussed recommendation for starting pitocin to bring contractions closer together, patient agreeable  Encourage frequent position changes as tolerated  Continue assessment of maternal and fetal wellbeing  Recheck as clinically indicated by maternal or fetal status  Anticipate second stage of labor    Gin Esposito, APRN-CNM, APRN-CNP    Subjective:  Keyla Lane \"Cydney\" is comfortable now with epidural, denies feeling contractions or rectal pressure.    Objective:  Fetal Monitoring      Baseline FHR: 135 per minute  Variability: moderate  Accelerations: yes  Decelerations: none  TOCO: regular, every 3-6 minutes    Cervical Exam:  6 cm dilated, 70 effaced, -1 station    Membrane status: ruptured (AROM at )    Vitals:    24 0207 24 0209 24 0212 24 0217   BP:       Pulse: 90 90 97 85   Resp:       Temp:       TempSrc:       SpO2: 100% (!) 92% 96% 98%      "

## 2024-11-13 NOTE — DISCHARGE INSTRUCTIONS

## 2024-11-14 ENCOUNTER — DOCUMENTATION (OUTPATIENT)
Dept: OBSTETRICS AND GYNECOLOGY | Facility: HOSPITAL | Age: 24
End: 2024-11-14
Payer: COMMERCIAL

## 2024-11-14 PROCEDURE — 2500000001 HC RX 250 WO HCPCS SELF ADMINISTERED DRUGS (ALT 637 FOR MEDICARE OP): Performed by: ADVANCED PRACTICE MIDWIFE

## 2024-11-14 PROCEDURE — 1210000001 HC SEMI-PRIVATE ROOM DAILY

## 2024-11-14 ASSESSMENT — PAIN SCALES - GENERAL
PAINLEVEL_OUTOF10: 4
PAINLEVEL_OUTOF10: 0 - NO PAIN
PAINLEVEL_OUTOF10: 3
PAINLEVEL_OUTOF10: 4
PAINLEVEL_OUTOF10: 0 - NO PAIN

## 2024-11-14 ASSESSMENT — PAIN DESCRIPTION - DESCRIPTORS: DESCRIPTORS: ACHING

## 2024-11-14 NOTE — ANESTHESIA POSTPROCEDURE EVALUATION
"Patient: Keyla Lane \"Cydney\"    Procedure Summary       Date: 24 Room / Location:     Anesthesia Start: 001 Anesthesia Stop: 902    Procedure: Labor Analgesia Diagnosis:     Scheduled Providers:  Responsible Provider: Alicia Garcia MD    Anesthesia Type: epidural ASA Status: 2            Anesthesia Type: epidural      Anesthesia Post Evaluation    Patient location during evaluation: floor  Patient participation: complete - patient participated  Level of consciousness: awake and alert  Pain management: adequate  Airway patency: patent  Cardiovascular status: acceptable and hemodynamically stable  Respiratory status: acceptable and room air  Hydration status: acceptable  Postoperative Nausea and Vomiting: none  Comments: Keyla Laen is a 24 y.o., , who had a Vaginal, Spontaneous delivery on 2024 at 39w2d and is now POD1.    She had Neuraxial Anesthesia without immediate complications noted.       Pain well controlled    ---------------------------               24                      0412         ---------------------------   BP:          111/75         Pulse:         77           Resp:          15           Temp:   36.3 °C (97.3 °F)   SpO2:          95%         ---------------------------    Neuraxial site assessed. No visible redness or swelling or drainage. Patient able to ambulate and move all extremities without difficulty. Able to void. No complaints of nausea/vomiting. Tolerating PO intake well. No s/sx of PDPH.     Anesthesia will sign off     CARLOTA Barnett-PRISCILLA          No notable events documented.    "

## 2024-11-14 NOTE — CARE PLAN
The patient's goals for the shift include bond with infant    The clinical goals for the shift include continue to meet pp milestones.    Over the shift, the patient made progress toward all the following goals. Patient remained free from falls/injury throughout shift. Pain well controlled on scheduled tylenol and motrin, no s/sx of infection at this time. VSS and lochia WNL. Patient is pumping independently on an appropriate schedule and demonstrating appropriate bonding with infant. Resting comfortably in bed, denies needs at this time.

## 2024-11-14 NOTE — PROGRESS NOTES
Postpartum Progress Note    Subjective   Her pain is well controlled with current medications. She is passing flatus. She is ambulating well. She is tolerating a regular diet. She reports poor milk letdown. She denies emotional concerns today. Her plan for contraception is tubal ligation.    Hospital course: lactation difficulties    Objective   Allergies:   Bee venom protein (honey bee)         Last Vitals:  Temp Pulse Resp BP MAP Pulse Ox        36.7 °C (98.1 °F)     87  16  105/70  82  96%       Physical Exam:  General: Examination reveals a well developed, well nourished, female, in no acute distress. She is alert and cooperative.  HEENT: PERRLA. External ears normal. Nose normal, no erythema or discharge. Mouth and throat clear.  Neck: supple, no significant adenopathy.  Breasts: breasts appear normal for pregnancy, no suspicious masses, no skin or nipple changes or axillary nodes, enlarged and darkened areolas.  Abdomen: soft, gravid, nontender, nondistended, no abnormal masses, no epigastric pain, fundus not palpable.  Fundus: tender.  Perineum: well approximated and well healing.  Neurological: alert, oriented, normal speech, no focal findings or movement disorder noted.  Psychological: awake and alert; oriented to person, place, and time.    Lab Data:  Labs in chart were reviewed.    Assessment/Plan   Keyla Lane is a 24 y.o., , who delivered at 39w2d gestation and is now postpartum day 1.    Maternal Wellbeing  - continue routine postpartum care  - pain well controlled on po medications  - Patient has been assessed to have a DVT risk score of 3, prophylactic lovenox not indicated  - Patient is Rh Positive (Rh+).  N/A    [place] Feeding  - Encouraged breastfeeding, lactation consult as needed    Contraception  - Tubal ligation    Pregnancy notable for  - macrosomia, iron-deficiency anemia, H/O heart palpitations, migraines, thrombocytosis, sciatic nerve pain,     Dispo  - appropriate for d/c on  PPD #2 if remains stable  - for f/u 4-6 weeks Primary OB provider for routine PPV    SWETA CASTILLO MS-3

## 2024-11-14 NOTE — CARE PLAN
Patient's vital signs remained stable through shift and the patient remained safe. Patient's pain is well controlled with tylenol and motrin. Bonding with baby was noted through shift.

## 2024-11-14 NOTE — PROGRESS NOTES
Postpartum Progress Note    Subjective   Her pain is well controlled with current medications. She is passing flatus. She is ambulating well. She is tolerating a regular diet. She reports poor milk letdown and plans to work with lactation. She denies emotional concerns today. Her plan for contraception is tubal ligation.    Hospital course: lactation difficulties    Objective   Allergies:   Bee venom protein (honey bee)         Last Vitals:  Temp Pulse Resp BP MAP Pulse Ox   36.7 °C (98.1 °F)    36.7 °C (98.1 °F)    8787 1616 105/86907/70  82 96 %96%       Physical Exam:  General: Examination reveals a well developed, well nourished, female, in no acute distress. She is alert and cooperative.  HEENT: PERRLA. External ears normal. Nose normal, no erythema or discharge. Mouth and throat clear.  Neck: supple, no significant adenopathy.  Breasts: breasts appear normal for postpartum, no suspicious masses, no skin or nipple changes or axillary nodes, enlarged and darkened areolas.  Abdomen: soft, non-tender, nondistended, no abnormal masses, no epigastric pain, fundus not palpable.  Fundus: tender.  Perineum: well approximated and well healing.  Neurological: alert, oriented, normal speech, no focal findings or movement disorder noted.  Psychological: awake and alert; oriented to person, place, and time.    Lab Data:  Labs in chart were reviewed.    Assessment/Plan   Keyla Lane is a 24 y.o., , who delivered at 39w2d gestation and is now postpartum day 1.    Maternal Wellbeing  - continue routine postpartum care  - pain well controlled on po medications  - Patient has been assessed to have a DVT risk score of 3, prophylactic lovenox not indicated  - Patient is Rh Positive (Rh+).  N/A    [place] Feeding  - Encouraged breastfeeding, lactation consult as needed    Contraception  - Tubal ligation- plans to schedule at a later date. Consents not signed in time to do during this stay.     Pregnancy notable for  -  macrosomia, iron-deficiency anemia, H/O heart palpitations, migraines, thrombocytosis, sciatic nerve pain,     Dispo  - appropriate for d/c on PPD #2 if remains stable  - for f/u 4-6 weeks Primary OB provider for routine PPV    SWETA CASTILLO MS-3

## 2024-11-14 NOTE — LACTATION NOTE
Lactation Consultant Note  Lactation Consultation  Reason for Consult: Initial assessment  Consultant Name: SHAYNA Bender RN IBCLC    Maternal Information  Has mother  before?: No  Infant to breast within first 2 hours of birth?: No  Breastfeeding Delayed Due to:  (mother plans to exclusively pump)  Exclusive Pump and Bottle Feed: Yes    Maternal Assessment  Breast Assessment: Medium, Symmetrical, Soft, Compressible  Nipple Assessment: Intact, Erect  Areola Assessment: Normal    Infant Assessment  Infant Behavior: Deep sleep    Feeding Assessment  Nutrition Source: Breastmilk, Formula (per mother’s request)  Feeding Method: Feeding expressed breastmilk, Paced bottle    LATCH TOOL       Breast Pump  Pump: [employer] grade electric pump, Double breast pumping  Frequency: 8-10 times per day  Duration: 15-20 minutes per session  Breast Shield Size and Type: 21 mm  Volume of Milk Production: 1  Units of Volume: mL per session    Other OB Lactation Tools       Patient Follow-up  Inpatient Lactation Follow-up Needed : Yes  Outpatient Lactation Follow-up: Recommended    Other OB Lactation Documentation  Infant Risk Factors: Early term birth 37-39 weeks    Recommendations/Summary    This mother plans to pump to establish her milk supply and then provide pumped breast via bottle when her milk is in. Until she is collecting enough colostrum/breast milk to feed her infant, she will formula-feed. The mother has 5-year-old for whom she did not pump or breastfeed. The mother was previously set up for pumping by her nurse. She pumped two prior times and collected enough colostrum to store in a syringe to feed her infant. We discussed the following topics: early milk production and the need for frequent nipple stimulation to develop an optimum milk supply; breast pump operation; pumping frequency and duration; cleaning/sterilization of breast pump parts; and guidelines for safe breast pump storage. The mother's nipples were  measured to determine the proper flange size. Her nipples measured 19 mm bilaterally and I suggested that she use the 21mm flanges. The mother has a breast pump for home use. She was given written information on pumping and breast milk storage. I also reviewed outpatient lactation services. All questions were answered and she is offered assistance as needed.

## 2024-11-15 VITALS
TEMPERATURE: 98.4 F | OXYGEN SATURATION: 95 % | WEIGHT: 167.33 LBS | HEART RATE: 74 BPM | RESPIRATION RATE: 16 BRPM | DIASTOLIC BLOOD PRESSURE: 72 MMHG | SYSTOLIC BLOOD PRESSURE: 108 MMHG | BODY MASS INDEX: 29.64 KG/M2

## 2024-11-15 PROCEDURE — 2500000004 HC RX 250 GENERAL PHARMACY W/ HCPCS (ALT 636 FOR OP/ED)

## 2024-11-15 PROCEDURE — 2500000001 HC RX 250 WO HCPCS SELF ADMINISTERED DRUGS (ALT 637 FOR MEDICARE OP): Performed by: ADVANCED PRACTICE MIDWIFE

## 2024-11-15 RX ORDER — MEDROXYPROGESTERONE ACETATE 150 MG/ML
150 INJECTION, SUSPENSION INTRAMUSCULAR ONCE
Status: COMPLETED | OUTPATIENT
Start: 2024-11-15 | End: 2024-11-15

## 2024-11-15 RX ORDER — ACETAMINOPHEN 160 MG/5ML
975 SOLUTION ORAL EVERY 6 HOURS PRN
Qty: 500 ML | Refills: 2 | Status: SHIPPED | OUTPATIENT
Start: 2024-11-15 | End: 2024-11-15

## 2024-11-15 RX ORDER — TRIPROLIDINE/PSEUDOEPHEDRINE 2.5MG-60MG
600 TABLET ORAL EVERY 6 HOURS SCHEDULED
Qty: 450 ML | Refills: 2 | Status: SHIPPED | OUTPATIENT
Start: 2024-11-15 | End: 2024-11-15

## 2024-11-15 RX ORDER — ACETAMINOPHEN 500 MG
1000 TABLET ORAL EVERY 6 HOURS PRN
Qty: 90 TABLET | Refills: 1 | Status: SHIPPED | OUTPATIENT
Start: 2024-11-15

## 2024-11-15 RX ORDER — IBUPROFEN 600 MG/1
600 TABLET ORAL EVERY 6 HOURS PRN
Qty: 90 TABLET | Refills: 1 | Status: SHIPPED | OUTPATIENT
Start: 2024-11-15

## 2024-11-15 ASSESSMENT — PAIN DESCRIPTION - DESCRIPTORS
DESCRIPTORS: ACHING;SORE
DESCRIPTORS: ACHING;CRAMPING

## 2024-11-15 ASSESSMENT — PAIN SCALES - GENERAL
PAINLEVEL_OUTOF10: 4
PAINLEVEL_OUTOF10: 4
PAINLEVEL_OUTOF10: 3
PAINLEVEL_OUTOF10: 0 - NO PAIN

## 2024-11-15 NOTE — PROGRESS NOTES
Postpartum Progress Note    Subjective    Pt doing well. Endorses mild cramping and low back pain, both well controlled with meds and heat packs. Afebrile. No chills.  Scant/normal lochia. Voiding. Up and walking.  Eating regular diet. No N/V. Passing gas. No BM yet.  Pumping and bottle feeding both breast milk and formula without difficulty.  No Complaints.     Objective    /77   Pulse 73   Temp 36.6 °C (97.9 °F) (Temporal)   Resp 16   Wt 75.9 kg (167 lb 5.3 oz)   SpO2 95%   Breastfeeding Yes   BMI 29.64 kg/m²      General: Examination reveals a well developed, well nourished, female, in no acute distress. She is alert and cooperative.  HEENT: normocephalic, atraumatic, conjunctiva clear  Lungs: unlabored respirations  Breasts: lactating, no erythema or tenderness, nipples normal.  Abdomen: Soft, non-distended  Fundus: firm, midline, and -2 below umbilicus.  Perineum: bilat labial lacs well approximated and hemostatic. Perineum intact.   Extremities: no redness or tenderness in the calves or thighs, no edema.  Psychological: awake and alert; oriented to person, place, and time.    Lab Results   Component Value Date    HGB 12.0 11/12/2024    HCT 37.1 11/12/2024     459ml QBL     Assessment/Plan    24 y.o. postpartum day 2 s/p SVB vaginal delivery  Principal Problem:    Encounter for induction of labor  Active Problems:    39 weeks gestation of pregnancy ([facility])    Iron deficiency anemia during pregnancy ([facility])    Heart palpitations    Fetal macrosomia in pregnancy, antepartum ([facility])      1. Postpartum care  - doing well, no complaints at this time.  - mood stable  - all BP's reviewed during inpatient stay and normotensive  - continue routine PP care.  - pain well controlled with PO medications.  - dvt risk score 3, lovenox ppx not indicated  - blood type O+; Rhogam not indicated    2. Contraception  - Depo as bridge to interval tubal, order placed  - abstinence strongly encouraged until  cessation of lochia    3. Feeding Baby  - pumping and bottle feeding infant breast milk and formula, encouraged to continue pumping q3 hours if she does not wish to put baby to breast  - consult lactation PRN.    4. Pregnancy notable for  - GAVI - s/p IV dextran, liquid iron   - fetal macrosomia - EFW 97% on 10/24  - heart palpitations - s/p holter monitor and echo WNL, resting HR 110s  - migraines - no meds     5. Dispo  - Anticipating discharge today  - Discharge education provided, pt verbalizes understanding and all questions answered  - Discussed PRN medications for discharge, pt opts to purchase OTC if needed  - F/U visit 4-6 weeks with primary OB provider.    CARLOTA Klein-LORENM

## 2024-11-15 NOTE — CARE PLAN
The patient's goals for the shift include bond with baby    The clinical goals for the shift include To have adequate pain control    Patient met all criteria to be discharged home

## 2024-11-15 NOTE — DISCHARGE SUMMARY
Discharge Summary    Admission Date: 11/12/2024  Discharge Date: 11/15/2024    Discharge Diagnosis  Encounter for induction of labor    Hospital Course  Delivery Date: 11/13/2024 9:02 AM  Delivery type: Vaginal, Spontaneous   GA at delivery: 39w2d  Outcome: Living  Anesthesia during delivery: Epidural  Intrapartum complications: None  Feeding method: Breastfeeding Status: Yes     Procedures:  electronic fetal monitoring, epidural analgesia, spontaneous vaginal delivery, and repair of labial laceration  Contraception at discharge: Depo Provera, plans for interval tubal ligation    Labor Course:  11/12 2030- 4/50/-2, AROM   2115 pit   11/13 0205 6/70/-1  0600 9/100/0  0746 9/100/0  0848 10/100/0  0902 SVB, bilat labial lacs (R-side repaired), 459cc QBL    Pertinent Physical Exam At Time of Discharge  See postpartum progress note    Last Vitals:  Temp Pulse Resp BP MAP Pulse Ox   36.6 °C (97.9 °F) 73 16 114/77 90 95 %     Discharge Meds     Your medication list        START taking these medications        Instructions Last Dose Given Next Dose Due   acetaminophen 650 mg/20.3 mL solution oral liquid  Commonly known as: Tylenol      Take 31.2 mL (1,000 mg) by mouth every 6 hours if needed for pain.       ibuprofen 100 mg/5 mL suspension  Replaces: ibuprofen 600 mg tablet      Take 30 mL (600 mg) by mouth every 6 hours.              CONTINUE taking these medications        Instructions Last Dose Given Next Dose Due   almotriptan 12.5 mg tablet  Commonly known as: Axert           cyproheptadine 4 mg tablet  Commonly known as: Periactin      TAKE 1 TABLET (4 MG) BY MOUTH 2 TIMES A DAY AS NEEDED (HEADACHE).       docusate sodium 100 mg capsule  Commonly known as: Colace           eletriptan 40 mg tablet  Commonly known as: Relpax           magnesium oxide 400 mg magnesium capsule      TAKE 1 CAPSULE BY MOUTH ONCE DAILY.**NOT COVERED       naratriptan 2.5 mg tablet  Commonly known as: Amerge           rizatriptan 10 mg  tablet  Commonly known as: Maxalt           Se-Gaurav 19 Chewable 29 mg iron- 1 mg tablet,chewable  Generic drug: prenatal no118-iron-folic acid      Chew 1 tablet once daily.              STOP taking these medications      ferrous sulfate, dried 160 mg (50 mg iron) ER tablet        ibuprofen 600 mg tablet  Replaced by: ibuprofen 100 mg/5 mL suspension                  Where to Get Your Medications        These medications were sent to [facility]/pharmacy #[id number] - [address] AT [place] [address]      Phone: [phone]   acetaminophen 650 mg/20.3 mL solution oral liquid  ibuprofen 100 mg/5 mL suspension          Complications Requiring Follow-Up  Routine 4-6 week postpartum visit    Test Results Pending At Discharge  Pending Labs       No current pending labs.            Outpatient Follow-Up  No future appointments.    I spent 25 minutes in the professional and overall care of this patient.      SAVANA Klein

## 2024-11-15 NOTE — CARE PLAN
The patient's goals for the shift include bond with baby    The clinical goals for the shift include Get some sleep    Patient ambulating and voiding with no complaints. Assessment and vitals per flowsheet. Pain controlled with PO liquid medications per MAR. Patient has no unanswered questions or concerns at this time.

## 2024-12-09 ENCOUNTER — APPOINTMENT (OUTPATIENT)
Dept: OBSTETRICS AND GYNECOLOGY | Facility: HOSPITAL | Age: 24
End: 2024-12-09
Payer: COMMERCIAL

## 2024-12-24 ENCOUNTER — APPOINTMENT (OUTPATIENT)
Dept: OBSTETRICS AND GYNECOLOGY | Facility: HOSPITAL | Age: 24
End: 2024-12-24
Payer: COMMERCIAL

## 2025-01-22 ENCOUNTER — APPOINTMENT (OUTPATIENT)
Dept: OBSTETRICS AND GYNECOLOGY | Facility: HOSPITAL | Age: 25
End: 2025-01-22
Payer: COMMERCIAL

## 2025-02-12 ENCOUNTER — POSTPARTUM VISIT (OUTPATIENT)
Dept: OBSTETRICS AND GYNECOLOGY | Facility: HOSPITAL | Age: 25
End: 2025-02-12
Payer: COMMERCIAL

## 2025-02-12 VITALS
BODY MASS INDEX: 25.16 KG/M2 | HEIGHT: 63 IN | SYSTOLIC BLOOD PRESSURE: 137 MMHG | DIASTOLIC BLOOD PRESSURE: 79 MMHG | WEIGHT: 142 LBS | HEART RATE: 82 BPM

## 2025-02-12 DIAGNOSIS — Z30.42 ENCOUNTER FOR DEPO-PROVERA CONTRACEPTION: Primary | ICD-10-CM

## 2025-02-12 PROCEDURE — 99214 OFFICE O/P EST MOD 30 MIN: CPT | Performed by: NURSE PRACTITIONER

## 2025-02-12 PROCEDURE — 96372 THER/PROPH/DIAG INJ SC/IM: CPT | Performed by: NURSE PRACTITIONER

## 2025-02-12 PROCEDURE — 2500000004 HC RX 250 GENERAL PHARMACY W/ HCPCS (ALT 636 FOR OP/ED): Performed by: NURSE PRACTITIONER

## 2025-02-12 RX ORDER — SUMATRIPTAN SUCCINATE 100 MG/1
100 TABLET ORAL ONCE AS NEEDED
COMMUNITY
Start: 2025-01-14

## 2025-02-12 RX ORDER — MEDROXYPROGESTERONE ACETATE 150 MG/ML
150 INJECTION, SUSPENSION INTRAMUSCULAR
Status: SHIPPED | OUTPATIENT
Start: 2025-02-12 | End: 2026-05-08

## 2025-02-12 RX ADMIN — MEDROXYPROGESTERONE ACETATE 150 MG: 150 INJECTION, SUSPENSION INTRAMUSCULAR at 13:08

## 2025-02-12 ASSESSMENT — ENCOUNTER SYMPTOMS
DEPRESSION: 1
OCCASIONAL FEELINGS OF UNSTEADINESS: 0
LOSS OF SENSATION IN FEET: 0

## 2025-02-12 ASSESSMENT — EDINBURGH POSTNATAL DEPRESSION SCALE (EPDS)
THINGS HAVE BEEN GETTING ON TOP OF ME: NO, MOST OF THE TIME I HAVE COPED QUITE WELL
I HAVE FELT SAD OR MISERABLE: NOT VERY OFTEN
I HAVE BEEN ANXIOUS OR WORRIED FOR NO GOOD REASON: NO, NOT AT ALL
I HAVE BEEN SO UNHAPPY THAT I HAVE BEEN CRYING: ONLY OCCASIONALLY
THE THOUGHT OF HARMING MYSELF HAS OCCURRED TO ME: NEVER
I HAVE BEEN SO UNHAPPY THAT I HAVE HAD DIFFICULTY SLEEPING: NOT AT ALL
I HAVE FELT SCARED OR PANICKY FOR NO GOOD REASON: NO, NOT AT ALL
TOTAL SCORE: 3
I HAVE BLAMED MYSELF UNNECESSARILY WHEN THINGS WENT WRONG: NO, NEVER
I HAVE LOOKED FORWARD WITH ENJOYMENT TO THINGS: AS MUCH AS I EVER DID
I HAVE BEEN ABLE TO LAUGH AND SEE THE FUNNY SIDE OF THINGS: AS MUCH AS I ALWAYS COULD

## 2025-02-12 ASSESSMENT — PAIN SCALES - GENERAL: PAINLEVEL_OUTOF10: 0-NO PAIN

## 2025-02-12 NOTE — PROGRESS NOTES
"Subjective   Patient ID: Keyla Lane \"Jacquelin" is a 24 y.o. female who presents for Annual Exam (Pt missed post partum visit./Pt delivered 11/13/24/Pt has no issues/Last pap 3/25/24/Pt request chaperone/).  HPI  Patient is here well past 6 weeks from delivery date.  She had a vaginal delivery of her second child.  She is bottlefeeding and occasionally pumping breastmilk.    She is doing well and is covered by Depo-Provera as her birth control method.  She signed tubal ligation papers during the pregnancy and would like to proceed with having that done.    She has no complaints at this time and is up to date with her last Pap smear being March 2024.  Review of Systems   All other systems reviewed and are negative.      Objective   Physical Exam  HENT:      Head: Normocephalic.   Cardiovascular:      Rate and Rhythm: Normal rate.   Pulmonary:      Effort: Pulmonary effort is normal.   Abdominal:      Palpations: Abdomen is soft.   Genitourinary:     Exam position: Lithotomy position.      Labia:         Right: No rash or tenderness.         Left: No rash or tenderness.       Vagina: No tenderness.      Cervix: Normal. No cervical motion tenderness.      Uterus: Normal. Not enlarged and not tender.       Adnexa: Right adnexa normal and left adnexa normal.   Musculoskeletal:         General: Normal range of motion.      Cervical back: Normal range of motion.   Skin:     General: Skin is warm and dry.   Neurological:      Mental Status: She is alert and oriented to person, place, and time.   Psychiatric:         Mood and Affect: Mood normal.         Assessment/Plan   Problem List Items Addressed This Visit    None  Visit Diagnoses         Codes    Encounter for Depo-Provera contraception    -  Primary Z30.42    Relevant Medications    medroxyPROGESTERone (Depo-Provera) injection 150 mg    Postpartum exam ([facility])     Z39.2        Message tasked to RN to assist in getting patient scheduled for tubal ligation.     "     Keiry Carranza, CARLOTA-CNP 02/12/25 3:46 PM

## 2025-02-13 ENCOUNTER — TELEPHONE (OUTPATIENT)
Dept: OBSTETRICS AND GYNECOLOGY | Facility: HOSPITAL | Age: 25
End: 2025-02-13
Payer: COMMERCIAL

## 2025-02-13 NOTE — TELEPHONE ENCOUNTER
RN called pt to schedule pre-op consult with MD. Pt name and  verified. Pt informed to begin the process of getting her tubal ligation scheduled, she must first have a pre-op consult with an MD. Pt scheduled for pre-op consult with Dr. Arroyo on 25. Pt verbalized understanding.  NOEL Sterling

## 2025-02-13 NOTE — TELEPHONE ENCOUNTER
----- Message from Zoë Spivey sent at 2/13/2025 12:58 PM EST -----  Regarding: RE: tubal ligation  Yeah I need to see her as a pre-op consult first. Thanks!  ----- Message -----  From: Hallie Morelos RN  Sent: 2/13/2025  12:44 PM EST  To: Zoë Spivey MD  Subject: FW: tubal ligation                               Hey! Would I need to get here scheduled for a gyn visit to get the case submitted?  ----- Message -----  From: Keiry Carranza APRN-CNP  Sent: 2/12/2025   3:45 PM EST  To: [facility] Clinical Support Staff  Subject: tubal ligation                                   Saw patient today for GYN visit/postpartum visit.  She had signed tubal ligation papers but it did not get done after the birth of her child.  The papers are still good and she is currently being covered by Depo-Provera.  She would like to get scheduled for a tubal ligation.  Despite being 24 years old she tells me that she was talked to on labor and delivery and was given the go ahead to get this done along with her 's approval.  I am not sure how to get that scheduled meaning if the GYN team would want to see her here in the office or if someone is willing to get her scheduled.    I need 1 of you to work on that for the patient and I.  Thank you

## 2025-02-26 ENCOUNTER — APPOINTMENT (OUTPATIENT)
Dept: OBSTETRICS AND GYNECOLOGY | Facility: HOSPITAL | Age: 25
End: 2025-02-26
Payer: COMMERCIAL

## 2025-03-12 ENCOUNTER — APPOINTMENT (OUTPATIENT)
Dept: OBSTETRICS AND GYNECOLOGY | Facility: HOSPITAL | Age: 25
End: 2025-03-12
Payer: COMMERCIAL

## 2025-03-12 ENCOUNTER — OFFICE VISIT (OUTPATIENT)
Dept: OBSTETRICS AND GYNECOLOGY | Facility: HOSPITAL | Age: 25
End: 2025-03-12
Payer: COMMERCIAL

## 2025-03-12 VITALS
DIASTOLIC BLOOD PRESSURE: 79 MMHG | HEIGHT: 63 IN | BODY MASS INDEX: 25.16 KG/M2 | SYSTOLIC BLOOD PRESSURE: 122 MMHG | WEIGHT: 142 LBS

## 2025-03-12 DIAGNOSIS — Z30.09 STERILIZATION EDUCATION: Primary | ICD-10-CM

## 2025-03-12 PROCEDURE — 99213 OFFICE O/P EST LOW 20 MIN: CPT | Performed by: OBSTETRICS & GYNECOLOGY

## 2025-03-12 PROCEDURE — 3008F BODY MASS INDEX DOCD: CPT | Performed by: OBSTETRICS & GYNECOLOGY

## 2025-03-12 PROCEDURE — 1036F TOBACCO NON-USER: CPT | Performed by: OBSTETRICS & GYNECOLOGY

## 2025-03-12 SDOH — ECONOMIC STABILITY: FOOD INSECURITY: WITHIN THE PAST 12 MONTHS, YOU WORRIED THAT YOUR FOOD WOULD RUN OUT BEFORE YOU GOT MONEY TO BUY MORE.: NEVER TRUE

## 2025-03-12 SDOH — ECONOMIC STABILITY: FOOD INSECURITY: WITHIN THE PAST 12 MONTHS, THE FOOD YOU BOUGHT JUST DIDN'T LAST AND YOU DIDN'T HAVE MONEY TO GET MORE.: NEVER TRUE

## 2025-03-12 ASSESSMENT — SOCIAL DETERMINANTS OF HEALTH (SDOH)
WITHIN THE LAST YEAR, HAVE YOU BEEN KICKED, HIT, SLAPPED, OR OTHERWISE PHYSICALLY HURT BY YOUR PARTNER OR EX-PARTNER?: NO
WITHIN THE LAST YEAR, HAVE YOU BEEN AFRAID OF YOUR PARTNER OR EX-PARTNER?: NO
WITHIN THE LAST YEAR, HAVE TO BEEN RAPED OR FORCED TO HAVE ANY KIND OF SEXUAL ACTIVITY BY YOUR PARTNER OR EX-PARTNER?: NO
WITHIN THE LAST YEAR, HAVE YOU BEEN HUMILIATED OR EMOTIONALLY ABUSED IN OTHER WAYS BY YOUR PARTNER OR EX-PARTNER?: NO

## 2025-03-12 ASSESSMENT — PAIN SCALES - GENERAL: PAINLEVEL_OUTOF10: 0-NO PAIN

## 2025-03-12 NOTE — PROGRESS NOTES
"Subjective   Patient ID: Keyla Lane \"Cydney\" is a 24 y.o. female who presents for Pre-op Visit (Patient is here to discuss tubal ligation/Last pap 3/25/2024 /Declined chaperone GUILLE PARADA).    HPI    Cydney is  would like to have permanent sterilization.   Has tried all the other contraception options apart from IUD  Is not interested in the IUD      Review of Systems   All other systems reviewed and are negative.        Objective       Visit Vitals  /79   Ht 1.6 m (5' 3\")   Wt 64.4 kg (142 lb)   LMP 2025 (Exact Date)   BMI 25.15 kg/m²   OB Status Injection   Smoking Status Never   BSA 1.69 m²      Physical Exam  Constitutional:       Appearance: Normal appearance.   HENT:      Head: Normocephalic and atraumatic.      Right Ear: External ear normal.      Left Ear: External ear normal.      Nose: Nose normal.   Eyes:      Extraocular Movements: Extraocular movements intact.      Conjunctiva/sclera: Conjunctivae normal.   Pulmonary:      Effort: Pulmonary effort is normal.   Neurological:      General: No focal deficit present.      Mental Status: She is alert and oriented to person, place, and time.   Psychiatric:         Mood and Affect: Mood normal.         Behavior: Behavior normal.         Thought Content: Thought content normal.         Judgment: Judgment normal.             Assessment/Plan   Diagnoses and all orders for this visit:  Sterilization education    Reviewed IUD, patient declined and would like to continue to pursue surgery  Reviewed the procedure and the lack of reversal options  Patient aware of the risk vs benefit of treatments   She would like to continue to pursue sterilization  Consent for treatment signed in 2024   Surgery to be scheduled next month  Video sent for tubal ligation to mary Gamino MD 25 11:17 AM   "

## 2025-03-13 NOTE — PROGRESS NOTES
I saw and evaluated the patient. I personally obtained the key and critical portions of the history and physical exam or was physically present for key and critical portions performed by the resident/fellow. I reviewed the resident/fellow's documentation and discussed the patient with the resident/fellow. I agree with the resident/fellow's medical decision making as documented in the note.    Perla Arroyo MD

## 2025-03-18 PROBLEM — Z30.09 STERILIZATION EDUCATION: Status: ACTIVE | Noted: 2025-03-12

## 2025-04-07 ENCOUNTER — PATIENT MESSAGE (OUTPATIENT)
Dept: OBSTETRICS AND GYNECOLOGY | Facility: HOSPITAL | Age: 25
End: 2025-04-07
Payer: COMMERCIAL

## 2025-04-09 ENCOUNTER — APPOINTMENT (OUTPATIENT)
Dept: OBSTETRICS AND GYNECOLOGY | Facility: HOSPITAL | Age: 25
End: 2025-04-09
Payer: COMMERCIAL

## 2025-04-17 ENCOUNTER — DOCUMENTATION (OUTPATIENT)
Dept: OBSTETRICS AND GYNECOLOGY | Facility: HOSPITAL | Age: 25
End: 2025-04-17
Payer: COMMERCIAL

## 2025-04-17 NOTE — PROGRESS NOTES
RN faxed document to [facility] [employer] for FMLA. Document submitted to be scanned to the patient's chart.  NOEL Sterling

## 2025-05-19 ENCOUNTER — TELEPHONE (OUTPATIENT)
Dept: OBSTETRICS AND GYNECOLOGY | Facility: CLINIC | Age: 25
End: 2025-05-19
Payer: COMMERCIAL

## 2025-05-19 NOTE — TELEPHONE ENCOUNTER
Kushal called back and would like to know if this is an emergent case. Stating nurse or surgery scheduler can call them back.

## 2025-05-19 NOTE — TELEPHONE ENCOUNTER
Patient is scheduled for surgery tomorrow. Pre-cert has sent Brittany an email regarding this, her surgery was not approved. They need to know how to proceed. Requesting return call from surgery scheduler or can respond to emails sent.

## 2025-05-30 DIAGNOSIS — Z01.818 PRE-OP EXAM: Primary | ICD-10-CM

## 2025-06-25 DIAGNOSIS — Z01.818 PRE-OP EXAM: ICD-10-CM

## 2025-08-25 ENCOUNTER — ANESTHESIA EVENT (OUTPATIENT)
Dept: OPERATING ROOM | Facility: HOSPITAL | Age: 25
End: 2025-08-25

## 2025-08-26 ENCOUNTER — HOSPITAL ENCOUNTER (OUTPATIENT)
Facility: HOSPITAL | Age: 25
Setting detail: OUTPATIENT SURGERY
Discharge: HOME | End: 2025-08-26
Attending: OBSTETRICS & GYNECOLOGY | Admitting: OBSTETRICS & GYNECOLOGY

## 2025-08-26 ENCOUNTER — ANESTHESIA (OUTPATIENT)
Dept: OPERATING ROOM | Facility: HOSPITAL | Age: 25
End: 2025-08-26

## 2025-08-26 VITALS
BODY MASS INDEX: 22.66 KG/M2 | HEIGHT: 63 IN | OXYGEN SATURATION: 100 % | HEART RATE: 75 BPM | TEMPERATURE: 98.2 F | RESPIRATION RATE: 15 BRPM | SYSTOLIC BLOOD PRESSURE: 132 MMHG | DIASTOLIC BLOOD PRESSURE: 64 MMHG | WEIGHT: 127.87 LBS

## 2025-08-26 DIAGNOSIS — Z30.09 STERILIZATION EDUCATION: ICD-10-CM

## 2025-08-26 DIAGNOSIS — Z90.79 STATUS POST BILATERAL SALPINGECTOMY: Primary | ICD-10-CM

## 2025-08-26 PROBLEM — O99.019 IRON DEFICIENCY ANEMIA DURING PREGNANCY: Status: RESOLVED | Noted: 2024-07-25 | Resolved: 2025-08-26

## 2025-08-26 PROBLEM — D50.9 IRON DEFICIENCY ANEMIA DURING PREGNANCY: Status: RESOLVED | Noted: 2024-07-25 | Resolved: 2025-08-26

## 2025-08-26 PROBLEM — G89.29 CHRONIC HEADACHES: Status: ACTIVE | Noted: 2025-08-26

## 2025-08-26 PROBLEM — Z3A.39 39 WEEKS GESTATION OF PREGNANCY (HHS-HCC): Status: RESOLVED | Noted: 2024-04-30 | Resolved: 2025-08-26

## 2025-08-26 PROBLEM — Z86.69 HISTORY OF MIGRAINE HEADACHES: Status: ACTIVE | Noted: 2025-08-26

## 2025-08-26 PROBLEM — R51.9 CHRONIC HEADACHES: Status: ACTIVE | Noted: 2025-08-26

## 2025-08-26 LAB — PREGNANCY TEST URINE, POC: NEGATIVE

## 2025-08-26 PROCEDURE — 3600000004 HC OR TIME - INITIAL BASE CHARGE - PROCEDURE LEVEL FOUR: Performed by: OBSTETRICS & GYNECOLOGY

## 2025-08-26 PROCEDURE — 7100000009 HC PHASE TWO TIME - INITIAL BASE CHARGE: Performed by: OBSTETRICS & GYNECOLOGY

## 2025-08-26 PROCEDURE — 2500000004 HC RX 250 GENERAL PHARMACY W/ HCPCS (ALT 636 FOR OP/ED): Mod: JZ,SE | Performed by: ANESTHESIOLOGY

## 2025-08-26 PROCEDURE — 58720 REMOVAL OF OVARY/TUBE(S): CPT | Performed by: OBSTETRICS & GYNECOLOGY

## 2025-08-26 PROCEDURE — 64999 UNLISTED PX NERVOUS SYSTEM: CPT

## 2025-08-26 PROCEDURE — 7100000002 HC RECOVERY ROOM TIME - EACH INCREMENTAL 1 MINUTE: Performed by: OBSTETRICS & GYNECOLOGY

## 2025-08-26 PROCEDURE — 3700000001 HC GENERAL ANESTHESIA TIME - INITIAL BASE CHARGE: Performed by: OBSTETRICS & GYNECOLOGY

## 2025-08-26 PROCEDURE — 7100000010 HC PHASE TWO TIME - EACH INCREMENTAL 1 MINUTE: Performed by: OBSTETRICS & GYNECOLOGY

## 2025-08-26 PROCEDURE — 3600000009 HC OR TIME - EACH INCREMENTAL 1 MINUTE - PROCEDURE LEVEL FOUR: Performed by: OBSTETRICS & GYNECOLOGY

## 2025-08-26 PROCEDURE — 2500000005 HC RX 250 GENERAL PHARMACY W/O HCPCS: Mod: SE | Performed by: OBSTETRICS & GYNECOLOGY

## 2025-08-26 PROCEDURE — 2500000004 HC RX 250 GENERAL PHARMACY W/ HCPCS (ALT 636 FOR OP/ED): Mod: SE

## 2025-08-26 PROCEDURE — A58661 PR LAP,RMV  ADNEXAL STRUCTURE: Performed by: ANESTHESIOLOGY

## 2025-08-26 PROCEDURE — 2720000007 HC OR 272 NO HCPCS: Performed by: OBSTETRICS & GYNECOLOGY

## 2025-08-26 PROCEDURE — 81025 URINE PREGNANCY TEST: CPT | Performed by: ANESTHESIOLOGY

## 2025-08-26 PROCEDURE — 3700000002 HC GENERAL ANESTHESIA TIME - EACH INCREMENTAL 1 MINUTE: Performed by: OBSTETRICS & GYNECOLOGY

## 2025-08-26 PROCEDURE — 7100000001 HC RECOVERY ROOM TIME - INITIAL BASE CHARGE: Performed by: OBSTETRICS & GYNECOLOGY

## 2025-08-26 PROCEDURE — 99231 SBSQ HOSP IP/OBS SF/LOW 25: CPT

## 2025-08-26 RX ORDER — FENTANYL CITRATE 50 UG/ML
INJECTION, SOLUTION INTRAMUSCULAR; INTRAVENOUS AS NEEDED
Status: DISCONTINUED | OUTPATIENT
Start: 2025-08-26 | End: 2025-08-26

## 2025-08-26 RX ORDER — WATER 1 ML/ML
INJECTION IRRIGATION AS NEEDED
Status: DISCONTINUED | OUTPATIENT
Start: 2025-08-26 | End: 2025-08-26 | Stop reason: HOSPADM

## 2025-08-26 RX ORDER — IBUPROFEN 600 MG/1
600 TABLET, FILM COATED ORAL EVERY 6 HOURS PRN
Qty: 20 TABLET | Refills: 0 | Status: SHIPPED | OUTPATIENT
Start: 2025-08-26

## 2025-08-26 RX ORDER — SODIUM CHLORIDE, SODIUM LACTATE, POTASSIUM CHLORIDE, CALCIUM CHLORIDE 600; 310; 30; 20 MG/100ML; MG/100ML; MG/100ML; MG/100ML
INJECTION, SOLUTION INTRAVENOUS CONTINUOUS PRN
Status: DISCONTINUED | OUTPATIENT
Start: 2025-08-26 | End: 2025-08-26

## 2025-08-26 RX ORDER — MIDAZOLAM HYDROCHLORIDE 2 MG/2ML
INJECTION, SOLUTION INTRAMUSCULAR; INTRAVENOUS AS NEEDED
Status: DISCONTINUED | OUTPATIENT
Start: 2025-08-26 | End: 2025-08-26

## 2025-08-26 RX ORDER — OXYCODONE HYDROCHLORIDE 5 MG/1
5 TABLET ORAL EVERY 4 HOURS PRN
Status: DISCONTINUED | OUTPATIENT
Start: 2025-08-26 | End: 2025-08-26 | Stop reason: HOSPADM

## 2025-08-26 RX ORDER — ONDANSETRON HYDROCHLORIDE 2 MG/ML
INJECTION, SOLUTION INTRAVENOUS AS NEEDED
Status: DISCONTINUED | OUTPATIENT
Start: 2025-08-26 | End: 2025-08-26

## 2025-08-26 RX ORDER — PROPOFOL 10 MG/ML
INJECTION, EMULSION INTRAVENOUS AS NEEDED
Status: DISCONTINUED | OUTPATIENT
Start: 2025-08-26 | End: 2025-08-26

## 2025-08-26 RX ORDER — OXYCODONE HYDROCHLORIDE 5 MG/1
5 TABLET ORAL EVERY 6 HOURS PRN
Qty: 12 TABLET | Refills: 0 | Status: SHIPPED | OUTPATIENT
Start: 2025-08-26

## 2025-08-26 RX ORDER — DEXMEDETOMIDINE IN 0.9 % NACL 20 MCG/5ML
SYRINGE (ML) INTRAVENOUS AS NEEDED
Status: DISCONTINUED | OUTPATIENT
Start: 2025-08-26 | End: 2025-08-26

## 2025-08-26 RX ORDER — LIDOCAINE HYDROCHLORIDE 20 MG/ML
INJECTION, SOLUTION INFILTRATION; PERINEURAL AS NEEDED
Status: DISCONTINUED | OUTPATIENT
Start: 2025-08-26 | End: 2025-08-26

## 2025-08-26 RX ORDER — LIDOCAINE HYDROCHLORIDE 10 MG/ML
0.1 INJECTION, SOLUTION EPIDURAL; INFILTRATION; INTRACAUDAL; PERINEURAL ONCE
Status: DISCONTINUED | OUTPATIENT
Start: 2025-08-26 | End: 2025-08-26 | Stop reason: HOSPADM

## 2025-08-26 RX ORDER — ONDANSETRON HYDROCHLORIDE 2 MG/ML
4 INJECTION, SOLUTION INTRAVENOUS ONCE AS NEEDED
Status: DISCONTINUED | OUTPATIENT
Start: 2025-08-26 | End: 2025-08-26 | Stop reason: HOSPADM

## 2025-08-26 RX ORDER — ACETAMINOPHEN 325 MG/1
650 TABLET ORAL EVERY 6 HOURS PRN
Qty: 20 TABLET | Refills: 0 | Status: SHIPPED | OUTPATIENT
Start: 2025-08-26

## 2025-08-26 RX ORDER — SODIUM CHLORIDE 0.9 G/100ML
INJECTION, SOLUTION IRRIGATION AS NEEDED
Status: DISCONTINUED | OUTPATIENT
Start: 2025-08-26 | End: 2025-08-26 | Stop reason: HOSPADM

## 2025-08-26 RX ORDER — ROCURONIUM BROMIDE 10 MG/ML
INJECTION, SOLUTION INTRAVENOUS AS NEEDED
Status: DISCONTINUED | OUTPATIENT
Start: 2025-08-26 | End: 2025-08-26

## 2025-08-26 RX ORDER — GLYCOPYRROLATE 0.2 MG/ML
INJECTION INTRAMUSCULAR; INTRAVENOUS AS NEEDED
Status: DISCONTINUED | OUTPATIENT
Start: 2025-08-26 | End: 2025-08-26

## 2025-08-26 RX ORDER — SODIUM CHLORIDE, SODIUM LACTATE, POTASSIUM CHLORIDE, CALCIUM CHLORIDE 600; 310; 30; 20 MG/100ML; MG/100ML; MG/100ML; MG/100ML
100 INJECTION, SOLUTION INTRAVENOUS CONTINUOUS
Status: DISCONTINUED | OUTPATIENT
Start: 2025-08-26 | End: 2025-08-26 | Stop reason: HOSPADM

## 2025-08-26 RX ORDER — HYDROMORPHONE HYDROCHLORIDE 0.2 MG/ML
0.2 INJECTION INTRAMUSCULAR; INTRAVENOUS; SUBCUTANEOUS EVERY 5 MIN PRN
Status: DISCONTINUED | OUTPATIENT
Start: 2025-08-26 | End: 2025-08-26 | Stop reason: HOSPADM

## 2025-08-26 RX ORDER — PHENYLEPHRINE HCL IN 0.9% NACL 0.4MG/10ML
SYRINGE (ML) INTRAVENOUS AS NEEDED
Status: DISCONTINUED | OUTPATIENT
Start: 2025-08-26 | End: 2025-08-26

## 2025-08-26 RX ADMIN — ROCURONIUM BROMIDE 50 MG: 10 INJECTION INTRAVENOUS at 07:44

## 2025-08-26 RX ADMIN — GLYCOPYRROLATE 0.2 MG: 0.2 SOLUTION INTRAMUSCULAR; INTRAVENOUS at 08:16

## 2025-08-26 RX ADMIN — ONDANSETRON 4 MG: 2 INJECTION INTRAMUSCULAR; INTRAVENOUS at 08:34

## 2025-08-26 RX ADMIN — SUGAMMADEX 200 MG: 100 INJECTION, SOLUTION INTRAVENOUS at 08:48

## 2025-08-26 RX ADMIN — MIDAZOLAM HYDROCHLORIDE 2 MG: 2 INJECTION, SOLUTION INTRAMUSCULAR; INTRAVENOUS at 07:04

## 2025-08-26 RX ADMIN — HYDROMORPHONE HYDROCHLORIDE 0.5 MG: 0.5 INJECTION, SOLUTION INTRAMUSCULAR; INTRAVENOUS; SUBCUTANEOUS at 09:05

## 2025-08-26 RX ADMIN — Medication 4 MCG: at 08:33

## 2025-08-26 RX ADMIN — SODIUM CHLORIDE, POTASSIUM CHLORIDE, SODIUM LACTATE AND CALCIUM CHLORIDE: 600; 310; 30; 20 INJECTION, SOLUTION INTRAVENOUS at 07:33

## 2025-08-26 RX ADMIN — LIDOCAINE HYDROCHLORIDE 100 MG: 20 INJECTION, SOLUTION INFILTRATION; PERINEURAL at 07:44

## 2025-08-26 RX ADMIN — FENTANYL CITRATE 100 MCG: 50 INJECTION, SOLUTION INTRAMUSCULAR; INTRAVENOUS at 07:44

## 2025-08-26 RX ADMIN — PROPOFOL 200 MG: 10 INJECTION, EMULSION INTRAVENOUS at 07:44

## 2025-08-26 RX ADMIN — DEXAMETHASONE SODIUM PHOSPHATE 8 MG: 4 INJECTION INTRA-ARTICULAR; INTRALESIONAL; INTRAMUSCULAR; INTRAVENOUS; SOFT TISSUE at 08:20

## 2025-08-26 RX ADMIN — Medication 80 MCG: at 08:00

## 2025-08-26 SDOH — HEALTH STABILITY: MENTAL HEALTH: CURRENT SMOKER: 1

## 2025-08-26 ASSESSMENT — PAIN - FUNCTIONAL ASSESSMENT
PAIN_FUNCTIONAL_ASSESSMENT: 0-10
PAIN_FUNCTIONAL_ASSESSMENT: 0-10
PAIN_FUNCTIONAL_ASSESSMENT: UNABLE TO SELF-REPORT
PAIN_FUNCTIONAL_ASSESSMENT: 0-10

## 2025-08-26 ASSESSMENT — PAIN SCALES - GENERAL
PAINLEVEL_OUTOF10: 7
PAINLEVEL_OUTOF10: 0 - NO PAIN
PAINLEVEL_OUTOF10: 2

## 2025-09-04 ENCOUNTER — APPOINTMENT (OUTPATIENT)
Dept: PRIMARY CARE | Facility: CLINIC | Age: 25
End: 2025-09-04

## (undated) DEVICE — LIGASURE, V SEALER/DIVIDER  5MM BLUNT TIP

## (undated) DEVICE — Device

## (undated) DEVICE — TUBE SET, PNEUMOCLEAR, SMOKE EVACU, HIGH-FLOW

## (undated) DEVICE — PREP TRAY, SKIN, DRY, W/GLOVES

## (undated) DEVICE — GLOVE, SURGICAL, PROTEXIS,  6.0, PF, LATEX

## (undated) DEVICE — PAD, PREP, SKIN BARRIER, WIPE, PREPPIES

## (undated) DEVICE — TOWEL, SURGICAL, NEURO, O/R, 16 X 26, BLUE, STERILE

## (undated) DEVICE — SUTURE, VICRYL, 0, 27 IN, UR-6, VIOLET

## (undated) DEVICE — COVER, CART, 45 X 27 X 48 IN, CLEAR

## (undated) DEVICE — HOLSTER, JET SAFETY

## (undated) DEVICE — SYSTEM, FIOS FIRST ENTRY, 5 X 100MM, KII ADVANCED FIXATION

## (undated) DEVICE — SUTURE, MONOCRYL, 4-0, 18 IN, PS2, UNDYED

## (undated) DEVICE — TROCAR SYSTEM, BALLOON, KII GELPORT, 12 X 100MM

## (undated) DEVICE — CARE KIT, LAPAROSCOPIC, ADVANCED

## (undated) DEVICE — MANIFOLD, 4 PORT NEPTUNE STANDARD

## (undated) DEVICE — CLIPPER, SURGICAL BLADE ASSEMBLY, GENERAL PURPOSE, SINGLE USE